# Patient Record
Sex: MALE
[De-identification: names, ages, dates, MRNs, and addresses within clinical notes are randomized per-mention and may not be internally consistent; named-entity substitution may affect disease eponyms.]

---

## 2017-03-06 ENCOUNTER — HOSPITAL ENCOUNTER (INPATIENT)
Dept: HOSPITAL 14 - H.ER | Age: 57
LOS: 12 days | Discharge: PSYCHIATRIC OR SUBSTANCE USE FACILITY | DRG: 430 | End: 2017-03-18
Attending: PSYCHIATRY & NEUROLOGY | Admitting: PSYCHIATRY & NEUROLOGY
Payer: MEDICAID

## 2017-03-06 DIAGNOSIS — E78.5: ICD-10-CM

## 2017-03-06 DIAGNOSIS — E78.00: ICD-10-CM

## 2017-03-06 DIAGNOSIS — E86.0: ICD-10-CM

## 2017-03-06 DIAGNOSIS — Z95.1: ICD-10-CM

## 2017-03-06 DIAGNOSIS — F17.200: ICD-10-CM

## 2017-03-06 DIAGNOSIS — I25.10: ICD-10-CM

## 2017-03-06 DIAGNOSIS — J45.909: ICD-10-CM

## 2017-03-06 DIAGNOSIS — E87.5: ICD-10-CM

## 2017-03-06 DIAGNOSIS — F20.0: Primary | ICD-10-CM

## 2017-03-06 LAB
ALBUMIN/GLOB SERPL: 1.4 {RATIO} (ref 1–2.1)
ALP SERPL-CCNC: 60 U/L (ref 38–126)
ALT SERPL-CCNC: 52 U/L (ref 21–72)
AST SERPL-CCNC: 107 U/L (ref 17–59)
BASOPHILS # BLD AUTO: 0 K/UL (ref 0–0.2)
BASOPHILS NFR BLD: 0.4 % (ref 0–2)
BILIRUB SERPL-MCNC: 0.6 MG/DL (ref 0.2–1.3)
BILIRUB UR-MCNC: NEGATIVE MG/DL
BUN SERPL-MCNC: 23 MG/DL (ref 9–20)
CALCIUM SERPL-MCNC: 10.2 MG/DL (ref 8.4–10.2)
CHLORIDE SERPL-SCNC: 105 MMOL/L (ref 98–107)
CO2 SERPL-SCNC: 20 MMOL/L (ref 22–30)
COLOR UR: (no result)
EOSINOPHIL # BLD AUTO: 0 K/UL (ref 0–0.7)
EOSINOPHIL NFR BLD: 0.4 % (ref 0–4)
ERYTHROCYTE [DISTWIDTH] IN BLOOD BY AUTOMATED COUNT: 13.8 % (ref 11.5–14.5)
ETHANOL SERPL-MCNC: < 10 MG/DL (ref 0–10)
GLOBULIN SER-MCNC: 3.5 GM/DL (ref 2.2–3.9)
GLUCOSE SERPL-MCNC: 111 MG/DL (ref 75–110)
GLUCOSE UR STRIP-MCNC: (no result) MG/DL
HCT VFR BLD CALC: 47.2 % (ref 35–51)
KETONES UR STRIP-MCNC: 20 MG/DL
LEUKOCYTE ESTERASE UR-ACNC: (no result) LEU/UL
LYMPHOCYTES # BLD AUTO: 1.9 K/UL (ref 1–4.3)
LYMPHOCYTES NFR BLD AUTO: 21.7 % (ref 20–40)
MCH RBC QN AUTO: 30.2 PG (ref 27–31)
MCHC RBC AUTO-ENTMCNC: 32.8 G/DL (ref 33–37)
MCV RBC AUTO: 92.2 FL (ref 80–94)
MONOCYTES # BLD: 0.7 K/UL (ref 0–0.8)
MONOCYTES NFR BLD: 8.5 % (ref 0–10)
NEUTROPHILS # BLD: 6 K/UL (ref 1.8–7)
NEUTROPHILS NFR BLD AUTO: 69 % (ref 50–75)
NRBC BLD AUTO-RTO: 0.1 % (ref 0–0)
PH UR STRIP: 5 [PH] (ref 5–8)
PLATELET # BLD: 288 K/UL (ref 130–400)
PMV BLD AUTO: 8.2 FL (ref 7.2–11.7)
POTASSIUM SERPL-SCNC: 3.4 MMOL/L (ref 3.6–5)
PROT SERPL-MCNC: 8.5 G/DL (ref 6.3–8.2)
PROT UR STRIP-MCNC: 100 MG/DL
RBC # UR STRIP: NEGATIVE /UL
RBC #/AREA URNS HPF: 7 /HPF (ref 0–3)
SODIUM SERPL-SCNC: 148 MMOL/L (ref 132–148)
SP GR UR STRIP: 1.03 (ref 1–1.03)
UROBILINOGEN UR-MCNC: 2 MG/DL (ref 0.2–1)
WBC # BLD AUTO: 8.7 K/UL (ref 4.8–10.8)
WBC #/AREA URNS HPF: 8 /HPF (ref 0–5)

## 2017-03-06 NOTE — ED PDOC
HPI: Psych/Substance Abuse


Time Seen by Provider: 03/06/17 20:38


Chief Complaint (Nursing): Psychiatric Evaluation


Chief Complaint (Provider): crisis eval


History Per: Patient, EMS


Additional History Per: Patient, EMS


Additional Complaint(s): 


57 y/o male brought in by EMS for crisis eval.  Patient found outside staring 

at stop sign.  Upon arrival patient with flight of idea's, speaking in 

[place] terms. however, responds appropriately when direct questions asked. 

Denies suicidal/homicidal ideations, acute medical complaints.  





Past Medical History


Reviewed: Historical Data, Nursing Documentation, Vital Signs


Vital Signs: 





 Last Vital Signs











Temp  97.5 F L  03/06/17 20:28


 


Pulse  96 H  03/06/17 20:28


 


Resp  16   03/06/17 20:28


 


BP  162/98 H  03/06/17 20:28


 


Pulse Ox  99   03/06/17 20:28














- Medical History


PMH: Hypercholesterolemia


   Denies: Chronic Kidney Disease





- Surgical History


Surgical History: CABG (may 14)





- Family History


Family History: States: Unknown Family Hx





- Immunization History


Hx Tetanus Toxoid Vaccination: No


Hx Influenza Vaccination: Yes


Hx Pneumococcal Vaccination: No





- Home Medications


Home Medications: 


 Ambulatory Orders











 Medication  Instructions  Recorded


 


Pseudoephedrine Hydrochlorid 120 mg PO Q12 PRN 03/27/14





[Sudafed 12 Hour]  


 


Albuterol HFA [Ventolin HFA 90 2 puff IH [id number] PRN #1 puff 10/18/14





mcg/actuation (8 g)]  














- Allergies


Allergies/Adverse Reactions: 


 Allergies











Allergy/AdvReac Type Severity Reaction Status Date / Time


 


No Known Allergies Allergy   Verified 10/18/14 20:06














Review of Systems


ROS Statement: Except As Marked, All Systems Reviewed And Found Negative


Psych: Positive for: Psychosis





Physical Exam





- Reviewed


Nursing Documentation Reviewed: Yes


Vital Signs Reviewed: Yes





- Physical Exam


Appears: Positive for: Well, Non-toxic, No Acute Distress


Head Exam: Positive for: ATRAUMATIC, NORMAL INSPECTION, NORMOCEPHALIC


Skin: Positive for: Normal Color


Eye Exam: Positive for: Normal appearance, EOMI, PERRL


ENT: Positive for: Normal ENT Inspection


Cardiovascular/Chest: Positive for: Regular Rate, Rhythm


Respiratory: Positive for: Normal Breath Sounds


Gastrointestinal/Abdominal: Positive for: Normal Exam


Extremity: Positive for: Normal ROM


Neurologic/Psych: Positive for: Alert, Oriented





- Laboratory Results


Result Diagrams: 


 03/06/17 21:20





 03/06/17 21:20





- ECG


ECG: Positive for: Viewed By Me (reviewed by ED attending)


ECG Rhythm: Positive for: Sinus Rhythm


O2 Sat by Pulse Oximetry: 99





- Radiology


X-Ray: Viewed By Me


X-Ray Interpretation: No Acute Disease





- Progress


ED Course And Treament: 


labs, ekg, chest xray, urine, crisis eval











EXAM:


CT Head Without Intravenous Contrast.


CLINICAL HISTORY:


56 years old, male; Signs and symptoms; Altered mental status/memory loss; 

Additional info: AMS


TECHNIQUE:


Axial computed tomography images of the head/brain without intravenous 

contrast. This CT exam


was performed using one or more of the following dose reduction techniques: 

automated exposure


control, adjustment of the mA and/or kV according to patient size, and/or use 

of iterative


reconstruction technique.


Coronal reformatted images were created and reviewed.


EXAM DATE/TIME:


3/6/2017 11:33 PM


COMPARISON:


There are no prior studies for comparison.


FINDINGS:


Brain: Ventricles are normal in size and configuration. There is no midline 

shift. There is mild


prominence of sulci and gyri. There are no intra-axial or extra-axial mass 

lesions or areas of


hemorrhage. There are no abnormal fluid collections. Gray-white differentiation 

is maintained.


Ventricles: See above.


Bones: Cranial vault is intact.


Soft tissues: unremarkable


Sinuses: There is mucoperiosteal thickening in the sinuses. There is an air-

fluid level in the right


maxillary sinus..


Ears and mastoids: Middle ears and mastoids are unremarkable


Orbits: Orbital contents are unremarkable.


IMPRESSION: No acute intracranial abnormality; sinus disease








Medical Decision Making


Medical Decision Making: 


Patient medically stable for psych admission.





Disposition





- Clinical Impression


Clinical Impression: 


 Acute psychosis





- Patient ED Disposition


Is Patient to be Admitted: Yes





- Disposition


Disposition Time: 00:10


Condition: STABLE





- Pt Status Changed To:


Hospital Disposition Of: Inpatient





- Admit Certification


Admit to Inpatient:: After my assessment, the patient will require 

hospitalization for at least two midnights.  This is because of the severity of 

symptoms shown, intensity of services needed, and/or the medical risk in this 

patient being treated as an outpatient.





- POA


Present On Arrival: None

## 2017-03-07 VITALS — OXYGEN SATURATION: 99 %

## 2017-03-07 PROCEDURE — GZHZZZZ GROUP PSYCHOTHERAPY: ICD-10-PCS | Performed by: PSYCHIATRY & NEUROLOGY

## 2017-03-07 NOTE — CP.PCM.CON
History of Present Illness





- History of Present Illness


History of Present Illness: 


Attending: Dr Montelongo





PCP: Not on Staff





Reason for Consult: Management of Asthma/ CAD and other medical maters





Chief Complaint: Crisis evaluation for bizarre behaviour





HPI: The hx is obtained from the  patient and the medical records. He is seen 

and examined in his room. He is a 56years old male with hx of HLd CAD Asthma 

and double bypass cardiac surgery was brought in for crisis evaluation after he 

was found out in the cold weather in inappropriate clothing, kneeling on the 

side walk stating that he was waiting for the stoplight to change. In the ED he 

was noted to have flight of ideas but responded to direct questioning. no 

headaches, dizziness,nausea, vomits, chest pain, palpitations, SOB , diarrhea 

nor urinary symptoms.








PMH: HLD, CAD, Child gonzalez Asthma





PSH: Double vessel CABG  on May of 2014





SH: light smoker 1/2PPd; No alcohol use; no illegal substance abuse





FH: No known family diseases





Allergies: NKDA





Review of Systems





- Constitutional


Constitutional: absent: Anorexia, Chills, Fatigue, Fever, Headache, Lethargy





- EENT


Eyes: Requires Corrective Lenses.  absent: Diplopia, Floaters, Spots in Vision


Ears: absent: Ear Discharge, Ear Pain, Tinnitus, Disequilibrium


Nose/Mouth/Throat: absent: Epistaxis, Nasal Congestion, Nasal Discharge, Sinus 

Pain, Sinus Pressure, Sore Throat





- Cardiovascular


Cardiovascular: absent: Chest Pain, Dyspnea, Leg Edema, Leg Ulcers





- Respiratory


Respiratory: absent: Cough, Dyspnea, Wheezing, Stridor, Chest Congestion





- Gastrointestinal


Gastrointestinal: absent: Constipation, Diarrhea, Nausea, Vomiting





- Genitourinary


Genitourinary: absent: Dysuria, Flank Pain, Hematuria, Urinary Frequency, Freq 

UTI





- Musculoskeletal


Musculoskeletal: absent: Arthralgias, Back Pain, Numbness, Stiffness





- Integumentary


Integumentary: absent: Pruritus, Rash, Skin Ulcer, Striae, Swelling, Other





- Neurological


Neurological: absent: Confusion, Dizziness, Headaches, Sensory Deficit, Weakness





- Psychiatric


Psychiatric: Confusion.  absent: Anxiety, Depression, Memory Loss, Panic Attacks





- Endocrine


Endocrine: absent: Palpitations, Polydipsia, Polyphagia, Polyuria





- Hematologic/Lymphatic


Hematologic: absent: Easy Bleeding, Easy Bruising





Past Patient History





- Past Medical History & Family History


Past Medical History?: Yes





- Past Social History


Smoking Status: Current Some Days Smoker


Chewing Tobacco Use: No


Cigar Use: No


Alcohol: None


Drugs: Denies


Home Situation {Lives}: Alone





- CARDIAC


Hx Hypercholesterolemia: Yes





- PULMONARY


Hx Respiratory Disorders: No





- NEUROLOGICAL


Hx Neurological Disorder: No





- HEENT


Hx HEENT Problems: Yes


Other/Comment: wears eyeglasses





- RENAL


Hx Chronic Kidney Disease: No





- ENDOCRINE/METABOLIC


Hx Endocrine Disorders: No





- HEMATOLOGICAL/ONCOLOGICAL


Hx Blood Disorders: No





- INTEGUMENTARY


Hx Dermatological Problems: No





- MUSCULOSKELETAL/RHEUMATOLOGICAL


Hx Musculoskeletal Disorders: No


Hx Falls: No





- GASTROINTESTINAL


Hx Gastrointestinal Disorders: No





- GENITOURINARY/GYNECOLOGICAL


Hx Genitourinary Disorders: No





- PSYCHIATRIC


Hx Psychophysiologic Disorder: No


Hx Substance Use: No





- SURGICAL HISTORY


Hx Coronary Artery Bypass Graft: Yes (may 14)





- ANESTHESIA


Hx Anesthesia: Yes


Hx Anesthesia Reactions: No





Meds


Allergies/Adverse Reactions: 


 Allergies











Allergy/AdvReac Type Severity Reaction Status Date / Time


 


No Known Allergies Allergy   Verified 10/18/14 20:06














- Medications


Medications: 


 Current Medications





Acetaminophen (Tylenol 325mg Tab)  650 mg PO Q4 PRN


   PRN Reason: t>101,headache,pain 1-7


Al Hydrox/Mg Hydrox/Simethicone (Maalox Plus 30 Ml)  30 ml PO Q4 PRN


   PRN Reason: Dyspepsia


Diphenhydramine HCl (Benadryl)  50 mg PO Q6 PRN


   PRN Reason: Extrapyramidal Symptoms


Diphenhydramine HCl (Benadryl)  50 mg IM Q6 PRN


   PRN Reason: Extrapyramidal S/S Unable PO


   Last Admin: 03/07/17 02:40 Dose:  50 mg


Diphenhydramine HCl (Benadryl)  50 mg PO HS PRN


   PRN Reason: Sleep


Haloperidol (Haldol)  5 mg PO Q4 PRN


   PRN Reason: Agitation


Haloperidol Lactate (Haldol)  5 mg IM Q4 PRN


   PRN Reason: Agitation, Unable to Take PO


   Last Admin: 03/07/17 02:39 Dose:  5 mg


Lorazepam (Ativan)  2 mg PO Q4 PRN


   PRN Reason: Anxiety/Agitation


Lorazepam (Ativan)  2 mg IM Q4 PRN


   PRN Reason: Anxiety/Agitation,Unable PO


   Last Admin: 03/07/17 02:39 Dose:  2 mg


Risperidone (Risperdal Tab)  1 mg PO HS IRA











Physical Exam





- Constitutional


Appears: No Acute Distress





- Head Exam


Head Exam: ATRAUMATIC, NORMAL INSPECTION, NORMOCEPHALIC





- Eye Exam


Eye Exam: EOMI, Normal appearance


Pupil Exam: NORMAL ACCOMODATION, PERRL





- ENT Exam


ENT Exam: Mucous Membranes Moist, Normal Exam, Normal External Ear Exam, Normal 

Oropharynx





- Neck Exam


Neck exam: Positive for: Full Rom, Normal Inspection.  Negative for: 

Lymphadenopathy, Tenderness





- Respiratory Exam


Respiratory Exam: Clear to Auscultation Bilateral.  absent: Rales, Rhonchi, 

Wheezes





- Cardiovascular Exam


Cardiovascular Exam: REGULAR RHYTHM, RRR, +S1, +S2.  absent: Gallop





- GI/Abdominal Exam


GI & Abdominal Exam: Normal Bowel Sounds, Soft.  absent: Mass, Organomegaly, 

Tenderness





- Rectal Exam


Rectal Exam: Deferred





- Extremities Exam


Extremities exam: Positive for: full ROM, normal inspection.  Negative for: 

calf tenderness, pedal edema





- Back Exam


Back exam: NORMAL INSPECTION.  absent: CVA tenderness (L), CVA tenderness (R)





- Neurological Exam


Neurological exam: Alert, CN II-XII Intact, Normal Gait, Oriented x3, Reflexes 

Normal





- Psychiatric Exam


Psychiatric exam: Normal Affect, Normal Mood





- Skin


Skin Exam: Dry, Intact, Normal Color, Warm





Results





- Vital Signs


Recent Vital Signs: 


 Last Vital Signs











Temp  97.2 F L  03/07/17 17:00


 


Pulse  83   03/07/17 17:00


 


Resp  18   03/07/17 17:00


 


BP  129/73   03/07/17 17:00


 


Pulse Ox  99   03/07/17 02:20














- Labs


Result Diagrams: 


 03/06/17 21:20





 03/06/17 21:20





- EKG Data


EKG comments: 


NSR 96/min





- Imaging and Cardiology


  ** Chest x-ray


Status: Image reviewed by me, Report reviewed by me


Additional comment: 


No Infiltrate





  ** CT scan - head


Status: Image reviewed by me, Report reviewed by me


Additional comment: 


No acute intracraneal abnormality





Assessment & Plan





- Assessment and Plan (Free Text)


Assessment: 


#. Psychiatric Disorder with Psychosis


#. Dehydration


#. Hyperkalemia


#. CAD s/p CABG


#. HLD


#. Asthma


Plan: 


56years old male with hx of HLd CAD Asthma and double bypass cardiac surgery 

was brought in for crisis evaluation after he was found out in the cold weather 

in inappropriate clothing, kneeling on the side walk stating that he was 

waiting for the stoplight to change. In the ED he was noted to have flight of 

ideas but responded to direct questioning. no headaches, dizziness,nausea, 

vomits, chest pain, palpitations, SOB , diarrhea nor urinary symptoms.





#. Psychiatric Disorder with Psychosis


- psychiatric management





#. Dehydration


- Encourage Fluid intake


- Follow renal labs





#. Hyperkalemia


- Repleted


- follow Electrolytes





#. CAD s/p CABG


- Restart ASA 81mg Po daioy


- lipitor start at 20mg Po daily and increase to therapeutic of 80mg daily


- follow lipid profile





#. HLD


- follow Lipid profile





#. Asthma


- Albuterol MDI Q6H PRN Wheezing/SOB





#. Elevated AST Not sufficiently high to hold statin, r/o Hepatitis C


- Follow hepatitis C antibody











- Date & Time


Date: 03/07/17


Time: 22:35

## 2017-03-07 NOTE — CT
EXAM:

  CT Head Without Intravenous Contrast.



CLINICAL HISTORY:

  56 years old, male; Signs and symptoms; Altered mental status/memory loss; 

Additional info: AMS



TECHNIQUE:

  Axial computed tomography images of the head/brain without intravenous 

contrast.  This CT exam was performed using one or more of the following dose 

reduction techniques:  automated exposure control, adjustment of the mA and/or 

kV according to patient size, and/or use of iterative reconstruction technique.

  Coronal reformatted images were created and reviewed.



EXAM DATE/TIME:

  3/6/2017 11:33 PM



COMPARISON:

  There are no prior studies for comparison.



FINDINGS:

  Brain:  Ventricles are normal in size and configuration. There is no midline 

shift.  There is mild prominence of sulci and gyri.  There are no intra-axial 

or extra-axial mass lesions or areas of hemorrhage. There are no abnormal fluid 

collections. Gray-white differentiation is maintained.

  Ventricles:  See above.

  Bones: Cranial vault is intact.

  Soft tissues:  unremarkable

  Sinuses:  There is mucoperiosteal thickening in the sinuses.  There is an 

air-fluid level in the right maxillary sinus..

   Ears and mastoids: Middle ears and mastoids are unremarkable

  Orbits:  Orbital contents are unremarkable.



IMPRESSION:  No acute intracranial abnormality; sinus disease

## 2017-03-07 NOTE — CARD
--------------- APPROVED REPORT --------------





EKG Measurement

Heart [id number]

[id number]

[id number]

[id number]

[id number]



&lt;Conclusion&gt;

Normal sinus rhythm

Possible Left atrial enlargement

Borderline ECG

## 2017-03-07 NOTE — RAD
HISTORY:

admission  



COMPARISON:

10/18/2014



TECHNIQUE:

Chest PA and lateral



FINDINGS:



LUNGS:

No active pulmonary disease.



PLEURA:

No significant pleural effusion identified. No pneumothorax apparent.



CARDIOVASCULAR:

Normal.



OSSEOUS STRUCTURES:

Sternal wires



VISUALIZED UPPER ABDOMEN:

Normal.



OTHER FINDINGS:

None.



IMPRESSION:

No active disease.
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
## 2017-03-08 LAB
BUN SERPL-MCNC: 17 MG/DL (ref 9–20)
CALCIUM SERPL-MCNC: 9.8 MG/DL (ref 8.4–10.2)
CHLORIDE SERPL-SCNC: 104 MMOL/L (ref 98–107)
CO2 SERPL-SCNC: 26 MMOL/L (ref 22–30)
GLUCOSE SERPL-MCNC: 89 MG/DL (ref 75–110)
POTASSIUM SERPL-SCNC: 3.5 MMOL/L (ref 3.6–5)
SODIUM SERPL-SCNC: 146 MMOL/L (ref 132–148)

## 2017-03-08 NOTE — PCM.PYCHPN
Psychiatric Progress Note





- Psychiatric Progress Note


Patient seen today, length of contact: in treatment team


Patient Chief Complaint: 


i am being subservient aren't i?


Problems Identified/Issues Discussed: 


pt seen in team. denying psychotic symptoms, but thoughts are not organized. he 

intellectualizes his symptoms/bizarre behaviors as an linguistic and 

philosophical choice. he takes risperdal despite denying he has a mental 

illness. he does endorse feeling better. he seems to be taking on a strategy to 

be passive and accepting any treatment offered to improve his chances of 

discharge.


Medication Change: No


Medical Record Reviewed: Yes





Mental Status Examination





- Cognitive Function


Orientation: Person, Place, Situation


Memory: Intact


Attention: WNL


Concentration: WNL


Association: Loose


Fund of Knowledge: WNL


Decription of patient's judgement and insights: 


poor insight, fair judgment





- Mood


Mood: Neutral





- Affect


Affect: Blunted





- Speech


Speech: Appropriate





- Formal Thought Process


Formal Thought Process: Delusions, Paranoia, Loosening of associations, Flight 

of ideas


Psychotic Thoughts and Behaviors: 


pt with disorganized thoughts, tangential answers to questions. 





- Suicidal Ideation


Suicidal Ideation: No





- Homicidal Ideation


Homicidal Ideation: No





Goal/Treatment Plan





- Goal/Treatment Plan


Need for Continued Stay: Remain at risks for inpatient hospitalization, 

Discharge may exacerbated symptoms, Severe functional impairment


Progress Toward Problem(s) and Goals/Treatment Plan: 


schizophrenia





needs further treatment


will increase risperdal tomorrow


disposition planing- could benefit from a php program


Estimated Date of D/C: 03/13/17

## 2017-03-09 NOTE — PCM.PYCHPN
Psychiatric Progress Note





- Psychiatric Progress Note


Patient seen today, length of contact: in treatment team


Patient Chief Complaint: 


i will do as i'm told


Problems Identified/Issues Discussed: 


pt spends time in room talking to himself. he denies medication side effects. 

he is without any aggression or agitation. continues to deny he has a mental 

illness despite accepting treatment. 


Medication Change: No


Medical Record Reviewed: Yes





Mental Status Examination





- Cognitive Function


Orientation: Person, Place, Situation


Memory: Intact


Attention: WNL


Concentration: WNL


Association: Loose


Fund of Knowledge: WNL


Decription of patient's judgement and insights: 


superficial insight, fair judgment





- Mood


Mood: Neutral





- Affect


Affect: Blunted





- Speech


Speech: Appropriate





- Formal Thought Process


Formal Thought Process: Delusions, Paranoia, Loosening of associations, Flight 

of ideas





- Suicidal Ideation


Suicidal Ideation: No





- Homicidal Ideation


Homicidal Ideation: No





Goal/Treatment Plan





- Goal/Treatment Plan


Need for Continued Stay: Remain at risks for inpatient hospitalization, 

Discharge may exacerbated symptoms, Severe functional impairment


Progress Toward Problem(s) and Goals/Treatment Plan: 


schizophrenia





needs further treatment


will increase risperdal to 2mg hs


disposition planing- could benefit from a php program


Estimated Date of D/C: 03/13/17

## 2017-03-10 RX ADMIN — RISPERIDONE SCH MG: 1 TABLET, ORALLY DISINTEGRATING ORAL at 11:30

## 2017-03-10 NOTE — PCM.PYCHPN
Psychiatric Progress Note





- Psychiatric Progress Note


Patient seen today, length of contact: discussed with team


Patient Chief Complaint: 


did you get the article?


Problems Identified/Issues Discussed: 


pt still pacing halls or in room talking to self. pt wants to share an article 

about psychology and multiple dimensions and asks to provide team with a power 

point presentation about the subject. he is complaint with medications. he 

denies side effects. he is bizarre in his behavior with peers./staff.


Medication Change: Yes (increase risperdal)


Medical Record Reviewed: Yes





Mental Status Examination





- Cognitive Function


Orientation: Person, Place, Situation


Memory: Intact


Attention: WNL


Concentration: WNL


Association: Loose


Fund of Knowledge: WNL





- Mood


Mood: Neutral





- Affect


Affect: Blunted





- Speech


Speech: Appropriate





- Formal Thought Process


Formal Thought Process: Delusions, Paranoia, Loosening of associations, Flight 

of ideas





- Suicidal Ideation


Suicidal Ideation: No





- Homicidal Ideation


Homicidal Ideation: No





Goal/Treatment Plan





- Goal/Treatment Plan


Need for Continued Stay: Remain at risks for inpatient hospitalization, 

Discharge may exacerbated symptoms, Severe functional impairment


Progress Toward Problem(s) and Goals/Treatment Plan: 


schizophrenia





needs further treatment


will increase risperdal to 2mg hs


disposition planing- could benefit from a php program


Estimated Date of D/C: 03/13/17

## 2017-03-11 RX ADMIN — RISPERIDONE SCH MG: 1 TABLET, ORALLY DISINTEGRATING ORAL at 09:39

## 2017-03-11 NOTE — PCM.PYCHPN
Psychiatric Progress Note





- Psychiatric Progress Note


Patient seen today, length of contact: pt seen and evaluated


Patient Chief Complaint: 


pt has remained very delusional and grandiose with disorganized thinking


DSM 5 Symptoms Update: 


schizophrenia ,paranoid type


Medication Change: Yes (increase risperdal)


Medical Record Reviewed: Yes





Mental Status Examination





- Cognitive Function


Orientation: Person, Place, Situation


Memory: Intact


Attention: WNL


Concentration: WNL


Association: Loose


Fund of Knowledge: WNL





- Mood


Mood: Neutral





- Affect


Affect: Blunted





- Speech


Speech: Appropriate





- Formal Thought Process


Formal Thought Process: Delusions, Paranoia, Loosening of associations, Flight 

of ideas





- Suicidal Ideation


Suicidal Ideation: No





- Homicidal Ideation


Homicidal Ideation: No





Goal/Treatment Plan





- Goal/Treatment Plan


Need for Continued Stay: Remain at risks for inpatient hospitalization, 

Discharge may exacerbated symptoms, Severe functional impairment


Progress Toward Problem(s) and Goals/Treatment Plan: 


will increase risperdal to 2mg in am to stabilize the psychosis


Estimated Date of D/C: 03/13/17

## 2017-03-12 NOTE — PCM.PYCHPN
Psychiatric Progress Note





- Psychiatric Progress Note


Patient seen today, length of contact: pt seen and evaluated


Patient Chief Complaint: 


pt has remained very delusional and grandiose with disorganized thinking


pt has been less paranoid with increase in risperdal


Problems Identified/Issues Discussed: 


admitted for grandiose delusions and disorganized thinking


DSM 5 Symptoms Update: 


schizophrenia disorganized


Medication Change: Yes (increase risperdal)


Medical Record Reviewed: Yes





Mental Status Examination





- Cognitive Function


Orientation: Person, Place, Situation


Memory: Intact


Attention: WNL


Concentration: WNL


Association: Loose


Fund of Knowledge: WNL





- Mood


Mood: Neutral





- Affect


Affect: Blunted





- Speech


Speech: Appropriate





- Formal Thought Process


Formal Thought Process: Delusions, Paranoia, Loosening of associations, Flight 

of ideas





- Suicidal Ideation


Suicidal Ideation: No





- Homicidal Ideation


Homicidal Ideation: No





Goal/Treatment Plan





- Goal/Treatment Plan


Need for Continued Stay: Remain at risks for inpatient hospitalization, 

Discharge may exacerbated symptoms, Severe functional impairment


Progress Toward Problem(s) and Goals/Treatment Plan: 


will increase risperdal to 2mg in am to stabilize the psychosis


Estimated Date of D/C: 03/13/17

## 2017-03-13 RX ADMIN — RISPERIDONE SCH MG: 2 TABLET, ORALLY DISINTEGRATING ORAL at 10:14

## 2017-03-13 RX ADMIN — RISPERIDONE SCH: 2 TABLET, ORALLY DISINTEGRATING ORAL at 23:33

## 2017-03-13 NOTE — PCM.PYCHPN
Psychiatric Progress Note





- Psychiatric Progress Note


Patient seen today, length of contact: discussed with team


Patient Chief Complaint: 


i am well thank you


Problems Identified/Issues Discussed: 


pt still internally preoccupied. bizarre at times. attempts to engage peers/

staff in intellectual conversations on esoteric topics with little insight into 

his own behaviors and no acknowledgment that he has a mental illness. he is 

still agreeable to treatment and he is denying any medication side effects. 


Medication Change: Yes (continue to increase risperdal)


Medical Record Reviewed: Yes





Mental Status Examination





- Cognitive Function


Orientation: Person, Place, Situation


Memory: Intact


Attention: WNL


Concentration: WNL


Association: Loose


Fund of Knowledge: WNL


Decription of patient's judgement and insights: 


poor insight, fair judgment





- Mood


Mood: Neutral





- Affect


Affect: Blunted





- Speech


Speech: Appropriate





- Formal Thought Process


Formal Thought Process: Delusions, Paranoia, Loosening of associations, Flight 

of ideas





- Suicidal Ideation


Suicidal Ideation: No





- Homicidal Ideation


Homicidal Ideation: No





Goal/Treatment Plan





- Goal/Treatment Plan


Need for Continued Stay: Remain at risks for inpatient hospitalization, 

Discharge may exacerbated symptoms, Severe functional impairment


Progress Toward Problem(s) and Goals/Treatment Plan: 


schizophrenia





needs further treatment


will increase risperdal to 2mg hs and 2mg am


disposition planing- could benefit from a php program


Estimated Date of D/C: 03/17/17

## 2017-03-14 RX ADMIN — RISPERIDONE SCH: 2 TABLET, ORALLY DISINTEGRATING ORAL at 09:44

## 2017-03-14 RX ADMIN — RISPERIDONE SCH: 2 TABLET, ORALLY DISINTEGRATING ORAL at 21:40

## 2017-03-14 NOTE — PCM.PYCHPN
Psychiatric Progress Note





- Psychiatric Progress Note


Patient seen today, length of contact: Patient evaluated, chart reviewed, case 

discussed with team


Problems Identified/Issues Discussed: 


Patient continues to be disorganized, loose and tangential on conversation.  He 

reports his mood is good, but does not give direct answers to most questions.   

When asked about AH, he stated that all people hear AH; but it is unclear if he 

is actually having AH.  He is stating that he wants to leave the hospital soon 

and is upset that he wasn't given an orientation on how to behave on the unit.  

Denies suicidal/homicidal ideation. 


Medication Change: Yes (Increase Risperdal to 2 mg PO AM/ 2.5 mg PO HS)


Medical Record Reviewed: Yes





Mental Status Examination





- Cognitive Function


Orientation: Person, Place, Situation


Memory: Intact


Attention: WNL


Concentration: WNL


Association: Loose


Fund of Knowledge: WNL


Decription of patient's judgement and insights: 


Poor I/J





- Mood


Mood: Neutral





- Affect


Affect: Broad





- Speech


Speech: Appropriate





- Formal Thought Process


Formal Thought Process: Delusions, Paranoia, Loosening of associations, Flight 

of ideas





- Suicidal Ideation


Suicidal Ideation: No





- Homicidal Ideation


Homicidal Ideation: No





Goal/Treatment Plan





- Goal/Treatment Plan


Need for Continued Stay: Remain at risks for inpatient hospitalization, 

Discharge may exacerbated symptoms, Severe functional impairment


Progress Toward Problem(s) and Goals/Treatment Plan: 


Impression: Schizophrenia, acutely psychotic and disorganized, requires 

continued inpatient admission for treatment and safety.





Plan:


-Increase Risperdal to 2 mg PO AM/ 2.5 mg PO HS


-Individual, group and milieu tx


-Disposition planing- could benefit from a php program


Estimated Date of D/C: 03/17/17

## 2017-03-15 VITALS — RESPIRATION RATE: 18 BRPM

## 2017-03-15 LAB
ALBUMIN/GLOB SERPL: 1.5 {RATIO} (ref 1–2.1)
ALP SERPL-CCNC: 67 U/L (ref 38–126)
ALT SERPL-CCNC: 49 U/L (ref 21–72)
AST SERPL-CCNC: 46 U/L (ref 17–59)
BASOPHILS # BLD AUTO: 0 K/UL (ref 0–0.2)
BASOPHILS NFR BLD: 0.8 % (ref 0–2)
BILIRUB SERPL-MCNC: 0.3 MG/DL (ref 0.2–1.3)
BILIRUB UR-MCNC: NEGATIVE MG/DL
BUN SERPL-MCNC: 12 MG/DL (ref 9–20)
CALCIUM SERPL-MCNC: 10.1 MG/DL (ref 8.4–10.2)
CHLORIDE SERPL-SCNC: 102 MMOL/L (ref 98–107)
CO2 SERPL-SCNC: 27 MMOL/L (ref 22–30)
COLOR UR: YELLOW
EOSINOPHIL # BLD AUTO: 0.2 K/UL (ref 0–0.7)
EOSINOPHIL NFR BLD: 2.8 % (ref 0–4)
ERYTHROCYTE [DISTWIDTH] IN BLOOD BY AUTOMATED COUNT: 13.5 % (ref 11.5–14.5)
GLOBULIN SER-MCNC: 3.1 GM/DL (ref 2.2–3.9)
GLUCOSE SERPL-MCNC: 101 MG/DL (ref 75–110)
GLUCOSE UR STRIP-MCNC: (no result) MG/DL
HCT VFR BLD CALC: 45 % (ref 35–51)
KETONES UR STRIP-MCNC: NEGATIVE MG/DL
LEUKOCYTE ESTERASE UR-ACNC: (no result) LEU/UL
LYMPHOCYTES # BLD AUTO: 1.6 K/UL (ref 1–4.3)
LYMPHOCYTES NFR BLD AUTO: 28.3 % (ref 20–40)
MCH RBC QN AUTO: 30.5 PG (ref 27–31)
MCHC RBC AUTO-ENTMCNC: 33.1 G/DL (ref 33–37)
MCV RBC AUTO: 92.2 FL (ref 80–94)
MONOCYTES # BLD: 1.1 K/UL (ref 0–0.8)
MONOCYTES NFR BLD: 20 % (ref 0–10)
NEUTROPHILS # BLD: 2.7 K/UL (ref 1.8–7)
NEUTROPHILS NFR BLD AUTO: 48.1 % (ref 50–75)
NRBC BLD AUTO-RTO: 0.1 % (ref 0–0)
PH UR STRIP: 6 [PH] (ref 5–8)
PLATELET # BLD: 297 K/UL (ref 130–400)
PMV BLD AUTO: 8 FL (ref 7.2–11.7)
POTASSIUM SERPL-SCNC: 3.5 MMOL/L (ref 3.6–5)
PROT SERPL-MCNC: 7.8 G/DL (ref 6.3–8.2)
PROT UR STRIP-MCNC: NEGATIVE MG/DL
RBC # UR STRIP: NEGATIVE /UL
RBC #/AREA URNS HPF: 1 /HPF (ref 0–3)
SODIUM SERPL-SCNC: 145 MMOL/L (ref 132–148)
SP GR UR STRIP: 1.02 (ref 1–1.03)
UROBILINOGEN UR-MCNC: 2 MG/DL (ref 0.2–1)
WBC # BLD AUTO: 5.6 K/UL (ref 4.8–10.8)
WBC #/AREA URNS HPF: 3 /HPF (ref 0–5)

## 2017-03-15 RX ADMIN — RISPERIDONE SCH: 2 TABLET, ORALLY DISINTEGRATING ORAL at 21:36

## 2017-03-15 RX ADMIN — RISPERIDONE SCH: 2 TABLET, ORALLY DISINTEGRATING ORAL at 17:33

## 2017-03-15 NOTE — PCM.PYCHPN
Psychiatric Progress Note





- Psychiatric Progress Note


Patient seen today, length of contact: discussed with team


Patient Chief Complaint: 


i don't have psychosis


Problems Identified/Issues Discussed: 


pt is refusing risperdal as he states it "freezes my whole alimentary canal" he 

denies being psychotic and defends his odd behaviors prior to admission as 

"psychoanalytic fanaticism" he feels he should be discharged and states he only 

stayed in the hospital because he was told he only would stay 5-7 days. he is 

pacing the halls, internally preoccupied. 


Medication Change: No ( )


Medical Record Reviewed: Yes





Mental Status Examination





- Cognitive Function


Orientation: Person, Place, Situation


Memory: Intact


Attention: WNL


Concentration: WNL


Association: Loose


Fund of Knowledge: WNL


Decription of patient's judgement and insights: 


poor





- Mood


Mood: Neutral





- Affect


Affect: Blunted





- Speech


Speech: Appropriate





- Formal Thought Process


Formal Thought Process: Delusions, Paranoia, Loosening of associations, Flight 

of ideas


Psychotic Thoughts and Behaviors: 


disorganized thoughts, denies a/v hallucinations





- Suicidal Ideation


Suicidal Ideation: No





- Homicidal Ideation


Homicidal Ideation: No





Goal/Treatment Plan





- Goal/Treatment Plan


Need for Continued Stay: Remain at risks for inpatient hospitalization, 

Discharge may exacerbated symptoms, Severe functional impairment


Progress Toward Problem(s) and Goals/Treatment Plan: 


schizophrenia





needs further treatment


will screen for involuntary hospitalization as pt is refusing treatment


disposition planing- could benefit from a php program


Estimated Date of D/C: 03/17/17

## 2017-03-16 RX ADMIN — RISPERIDONE SCH: 2 TABLET, ORALLY DISINTEGRATING ORAL at 08:42

## 2017-03-16 RX ADMIN — RISPERIDONE SCH: 2 TABLET, ORALLY DISINTEGRATING ORAL at 21:12

## 2017-03-16 NOTE — PCM.PYCHPN
Psychiatric Progress Note





- Psychiatric Progress Note


Patient seen today, length of contact: discussed with team


Patient Chief Complaint: 


do i really have to go there


Problems Identified/Issues Discussed: 


pt is refusing medications. he is asking for niacin only. he attends groups. he 

engages in superficial intellectual discussions. he states his mission is to 

"psychoanalyze the world"  he is paranoid. he is aware of his transfer to [facility] [place] [place].


Medication Change: No ( )


Medical Record Reviewed: Yes





Mental Status Examination





- Cognitive Function


Orientation: Person, Place, Situation


Memory: Intact


Attention: WNL


Concentration: WNL


Association: Loose


Fund of Knowledge: WNL


Decription of patient's judgement and insights: 


fair





- Mood


Mood: Anxious





- Affect


Affect: Blunted





- Speech


Speech: Appropriate





- Formal Thought Process


Formal Thought Process: Delusions, Paranoia, Loosening of associations, Flight 

of ideas


Psychotic Thoughts and Behaviors: 


paranoid, disorganized





- Suicidal Ideation


Suicidal Ideation: No





- Homicidal Ideation


Homicidal Ideation: No





Goal/Treatment Plan





- Goal/Treatment Plan


Need for Continued Stay: Remain at risks for inpatient hospitalization, 

Discharge may exacerbated symptoms, Severe functional impairment, Other


Progress Toward Problem(s) and Goals/Treatment Plan: 


schizophrenia





needs further treatment


pt to transfer to [facility] [place] [place] when a bed available


will start niacin at pt's request.


Estimated Date of D/C: 03/17/17

## 2017-03-17 RX ADMIN — RISPERIDONE SCH: 2 TABLET, ORALLY DISINTEGRATING ORAL at 09:16

## 2017-03-17 RX ADMIN — RISPERIDONE SCH: 2 TABLET, ORALLY DISINTEGRATING ORAL at 21:53

## 2017-03-17 NOTE — PCM.PYCHPN
Psychiatric Progress Note





- Psychiatric Progress Note


Patient seen today, length of contact: discussed with team


Patient Chief Complaint: 


thank you for the niacin


Problems Identified/Issues Discussed: 


pt is refusing medications. he feels niacin is helpful. he is on the pay phone 

much of the am today. he is aware of his pending transfer to another facility. 

he remains disorganized in his thoughts.


Medication Change: No ( )


Medical Record Reviewed: Yes





Mental Status Examination





- Cognitive Function


Orientation: Person, Place, Situation


Memory: Intact


Attention: WNL


Concentration: WNL


Association: Loose


Fund of Knowledge: WNL


Decription of patient's judgement and insights: 


poor





- Mood


Mood: Anxious





- Affect


Affect: Blunted





- Speech


Speech: Appropriate





- Formal Thought Process


Formal Thought Process: Delusions, Paranoia, Loosening of associations, Flight 

of ideas





- Suicidal Ideation


Suicidal Ideation: No





- Homicidal Ideation


Homicidal Ideation: No





Goal/Treatment Plan





- Goal/Treatment Plan


Need for Continued Stay: Remain at risks for inpatient hospitalization, 

Discharge may exacerbated symptoms, Severe functional impairment, Other


Progress Toward Problem(s) and Goals/Treatment Plan: 


schizophrenia





needs further treatment


pt to transfer to [facility] [place] when a bed available


continue niacin


Estimated Date of D/C: 03/17/17

## 2017-03-18 VITALS — SYSTOLIC BLOOD PRESSURE: 132 MMHG | TEMPERATURE: 96.4 F | DIASTOLIC BLOOD PRESSURE: 81 MMHG | HEART RATE: 82 BPM

## 2017-03-18 RX ADMIN — RISPERIDONE SCH: 2 TABLET, ORALLY DISINTEGRATING ORAL at 08:43

## 2017-03-18 NOTE — PCM.PYCHDC
Mental Status Examination





- Mental Status Examination


Description of patient's judgement and insight: 


poor insight


Psychotic Thoughts and Behaviors: 


paranoid, disorganized


Suicidal Ideation: No


Current Homicidal Ideation?: No


Plan: 


see mse from today's progress note





Discharge Summary





- Discharge Note


Reason for Hospitalization: 


bizarre behaviors/psychosis


Psychiatric History (includes Medical, Family, Personal Hx): denies history of 

treatment for psychosis


Consultations:: List each consultation separately and include:  1. Reason for 

request.  2. Findings.  3. Follow-up


Consultations: 


seen by the hospitalist


Summary of Hospital Course include:: 1. Description of specific treatment plan 

utilized for patients during their course of treatmen.  2. Summarize the time-

course for resolution of acute symptoms and/or regressed behaviors.  3. 

Describe issues identified and worked on during hospitalization.  4. Describe 

medication utilized.  5. Describe medical problems identified and treated.  6. 

Reassessment of suicide risk


Summary of Hospital Course: 


pt was admitted to [facility] and oriented to the unit. he was placed on routine 

safety protocols. he was started on risperdal and the dose was titrated up to 

target his psychosis. pt, expressed that "my contact " and stopped 

taking risperdal. he became more irritable and was demanding discharge. he 

signed a 48 hour notice and was screened by [facility] and 

found to meet criteria for an involuntary assessment.





while taking risperdal, but showed minimal improvement, but no c/o side 

effects. he continued to report that he did not have any problems with mental 

illness and, in fact was here to psychoanalyze everyone in the world. he was 

observed talking to himself in his room during down times on the unit. he was 

expressing belief that he was being followed and monitored by the government 

because he was conducting some concerning research regarding mass killings from 

the [place].





the pt continued to deny any a/v hallucinations or si/hi. 





- Final Diagnosis (DSM 5)


Condition upon Discharge: STABLE


DSM 5: 


schizophrenia, paranoid


Disposition: HOME/ ROUTINE


Follow-up Treatment Plan: 


pt to f/u with the treatment team at [facility] [place]





- Smoking Cessation


Smoking Cessation Medication prescribed: No


Reason for not providing: does not smoke





- Antipsychotic Medications


Pt discharged on 2 or more routine antipsychotic medications: No

## 2017-03-18 NOTE — PCM.PYCHPN
Psychiatric Progress Note





- Psychiatric Progress Note


Patient seen today, length of contact: discussed with team


Patient Chief Complaint: 


do you think that xxxx(name of pt) looks jaundiced, i think you should check 

for hepatitis 


Problems Identified/Issues Discussed: 


pt is still refusing meds. he is intrusive with peers. spends his day in room 

talking to self. pt today is asking this writer to check other pt for hepatitis 

as he thinks they look jaundiced. he continues to lecture about psychoanalytic 

theory. 


Medication Change: No ( )


Medical Record Reviewed: Yes





Mental Status Examination





- Cognitive Function


Orientation: Person, Place, Situation


Memory: Intact


Attention: WNL


Concentration: WNL


Association: Loose


Fund of Knowledge: WNL


Decription of patient's judgement and insights: 


poor insight





- Mood


Mood: Anxious





- Affect


Affect: Blunted





- Speech


Speech: Appropriate





- Formal Thought Process


Formal Thought Process: Delusions, Paranoia, Loosening of associations, Flight 

of ideas





- Suicidal Ideation


Suicidal Ideation: No





- Homicidal Ideation


Homicidal Ideation: No





Goal/Treatment Plan





- Goal/Treatment Plan


Need for Continued Stay: Remain at risks for inpatient hospitalization, 

Discharge may exacerbated symptoms, Severe functional impairment, Other


Progress Toward Problem(s) and Goals/Treatment Plan: 


schizophrenia





needs further treatment


pt to transfer to [facility] when a bed available


continue niacin


Estimated Date of D/C: 03/17/17

## 2017-11-19 ENCOUNTER — INPATIENT (INPATIENT)
Facility: HOSPITAL | Age: 57
LOS: 14 days | Discharge: HOME | End: 2017-12-04
Attending: PSYCHIATRY & NEUROLOGY | Admitting: STUDENT IN AN ORGANIZED HEALTH CARE EDUCATION/TRAINING PROGRAM
Payer: MEDICAID

## 2017-11-19 ENCOUNTER — EMERGENCY (EMERGENCY)
Facility: HOSPITAL | Age: 57
LOS: 1 days | Discharge: HOME | End: 2017-11-19
Attending: EMERGENCY MEDICINE | Admitting: EMERGENCY MEDICINE
Payer: SELF-PAY

## 2017-11-19 VITALS
TEMPERATURE: 98 F | OXYGEN SATURATION: 99 % | SYSTOLIC BLOOD PRESSURE: 120 MMHG | DIASTOLIC BLOOD PRESSURE: 70 MMHG | HEART RATE: 94 BPM | RESPIRATION RATE: 16 BRPM

## 2017-11-19 VITALS — OXYGEN SATURATION: 100 % | HEIGHT: 70 IN | TEMPERATURE: 97 F | RESPIRATION RATE: 16 BRPM | WEIGHT: 205.03 LBS

## 2017-11-19 VITALS
HEART RATE: 71 BPM | OXYGEN SATURATION: 100 % | SYSTOLIC BLOOD PRESSURE: 129 MMHG | DIASTOLIC BLOOD PRESSURE: 66 MMHG | RESPIRATION RATE: 16 BRPM

## 2017-11-19 DIAGNOSIS — R41.82 ALTERED MENTAL STATUS, UNSPECIFIED: ICD-10-CM

## 2017-11-19 DIAGNOSIS — I25.10 ATHEROSCLEROTIC HEART DISEASE OF NATIVE CORONARY ARTERY WITHOUT ANGINA PECTORIS: ICD-10-CM

## 2017-11-19 DIAGNOSIS — F31.2 BIPOLAR DISORDER, CURRENT EPISODE MANIC SEVERE WITH PSYCHOTIC FEATURES: ICD-10-CM

## 2017-11-19 DIAGNOSIS — F31.9 BIPOLAR DISORDER, UNSPECIFIED: ICD-10-CM

## 2017-11-19 DIAGNOSIS — F30.9 MANIC EPISODE, UNSPECIFIED: ICD-10-CM

## 2017-11-19 LAB
ALBUMIN SERPL ELPH-MCNC: 3.7 G/DL — SIGNIFICANT CHANGE UP (ref 3.4–5)
ALP SERPL-CCNC: 40 U/L — SIGNIFICANT CHANGE UP (ref 40–120)
ALT FLD-CCNC: 48 U/L — HIGH (ref 12–42)
ANION GAP SERPL CALC-SCNC: 11 MMOL/L — SIGNIFICANT CHANGE UP (ref 9–16)
AST SERPL-CCNC: 56 U/L — HIGH (ref 15–37)
BILIRUB SERPL-MCNC: 0.5 MG/DL — SIGNIFICANT CHANGE UP (ref 0.2–1.2)
BUN SERPL-MCNC: 18 MG/DL — SIGNIFICANT CHANGE UP (ref 7–23)
CALCIUM SERPL-MCNC: 9.4 MG/DL — SIGNIFICANT CHANGE UP (ref 8.5–10.5)
CHLORIDE SERPL-SCNC: 106 MMOL/L — SIGNIFICANT CHANGE UP (ref 96–108)
CO2 SERPL-SCNC: 25 MMOL/L — SIGNIFICANT CHANGE UP (ref 22–31)
CREAT SERPL-MCNC: 0.72 MG/DL — SIGNIFICANT CHANGE UP (ref 0.5–1.3)
ETHANOL SERPL-MCNC: <3 MG/DL — SIGNIFICANT CHANGE UP
GLUCOSE SERPL-MCNC: 98 MG/DL — SIGNIFICANT CHANGE UP (ref 70–99)
HCT VFR BLD CALC: 41.2 % — SIGNIFICANT CHANGE UP (ref 39–50)
HGB BLD-MCNC: 13.7 G/DL — SIGNIFICANT CHANGE UP (ref 13–17)
MCHC RBC-ENTMCNC: 30.2 PG — SIGNIFICANT CHANGE UP (ref 27–34)
MCHC RBC-ENTMCNC: 33.3 G/DL — SIGNIFICANT CHANGE UP (ref 32–36)
MCV RBC AUTO: 90.9 FL — SIGNIFICANT CHANGE UP (ref 80–100)
PCP SPEC-MCNC: SIGNIFICANT CHANGE UP
PLATELET # BLD AUTO: 273 K/UL — SIGNIFICANT CHANGE UP (ref 150–400)
POTASSIUM SERPL-MCNC: 3.5 MMOL/L — SIGNIFICANT CHANGE UP (ref 3.5–5.3)
POTASSIUM SERPL-SCNC: 3.5 MMOL/L — SIGNIFICANT CHANGE UP (ref 3.5–5.3)
PROT SERPL-MCNC: 6.9 G/DL — SIGNIFICANT CHANGE UP (ref 6.4–8.2)
RBC # BLD: 4.53 M/UL — SIGNIFICANT CHANGE UP (ref 4.2–5.8)
RBC # FLD: 12.8 % — SIGNIFICANT CHANGE UP (ref 10.3–16.9)
SODIUM SERPL-SCNC: 142 MMOL/L — SIGNIFICANT CHANGE UP (ref 132–145)
TSH SERPL-MCNC: 1.51 UIU/ML — SIGNIFICANT CHANGE UP (ref 0.36–3.74)
WBC # BLD: 4.8 K/UL — SIGNIFICANT CHANGE UP (ref 3.8–10.5)
WBC # FLD AUTO: 4.8 K/UL — SIGNIFICANT CHANGE UP (ref 3.8–10.5)

## 2017-11-19 PROCEDURE — 99285 EMERGENCY DEPT VISIT HI MDM: CPT | Mod: 25

## 2017-11-19 PROCEDURE — 93010 ELECTROCARDIOGRAM REPORT: CPT

## 2017-11-19 PROCEDURE — 70450 CT HEAD/BRAIN W/O DYE: CPT | Mod: 26

## 2017-11-19 PROCEDURE — 90792 PSYCH DIAG EVAL W/MED SRVCS: CPT | Mod: GT

## 2017-11-19 PROCEDURE — 99053 MED SERV 10PM-8AM 24 HR FAC: CPT

## 2017-11-19 RX ORDER — DIPHENHYDRAMINE HCL 50 MG
50 CAPSULE ORAL EVERY 4 HOURS
Qty: 0 | Refills: 0 | Status: DISCONTINUED | OUTPATIENT
Start: 2017-11-19 | End: 2017-12-04

## 2017-11-19 RX ORDER — DIPHENHYDRAMINE HCL 50 MG
50 CAPSULE ORAL EVERY 6 HOURS
Qty: 0 | Refills: 0 | Status: DISCONTINUED | OUTPATIENT
Start: 2017-11-19 | End: 2017-11-22

## 2017-11-19 RX ORDER — THIAMINE MONONITRATE (VIT B1) 100 MG
100 TABLET ORAL DAILY
Qty: 0 | Refills: 0 | Status: DISCONTINUED | OUTPATIENT
Start: 2017-11-19 | End: 2017-12-04

## 2017-11-19 RX ORDER — FOLIC ACID 0.8 MG
1 TABLET ORAL DAILY
Qty: 0 | Refills: 0 | Status: DISCONTINUED | OUTPATIENT
Start: 2017-11-19 | End: 2017-12-04

## 2017-11-19 RX ORDER — DIPHENHYDRAMINE HCL 50 MG
50 CAPSULE ORAL EVERY 6 HOURS
Qty: 0 | Refills: 0 | Status: DISCONTINUED | OUTPATIENT
Start: 2017-11-19 | End: 2017-12-04

## 2017-11-19 RX ORDER — HALOPERIDOL DECANOATE 100 MG/ML
5 INJECTION INTRAMUSCULAR EVERY 6 HOURS
Qty: 0 | Refills: 0 | Status: DISCONTINUED | OUTPATIENT
Start: 2017-11-19 | End: 2017-12-04

## 2017-11-19 RX ORDER — HALOPERIDOL DECANOATE 100 MG/ML
5 INJECTION INTRAMUSCULAR ONCE
Qty: 0 | Refills: 0 | Status: COMPLETED | OUTPATIENT
Start: 2017-11-19 | End: 2017-11-19

## 2017-11-19 RX ORDER — HALOPERIDOL DECANOATE 100 MG/ML
5 INJECTION INTRAMUSCULAR ONCE
Qty: 0 | Refills: 0 | Status: DISCONTINUED | OUTPATIENT
Start: 2017-11-19 | End: 2017-12-04

## 2017-11-19 RX ORDER — ASPIRIN/CALCIUM CARB/MAGNESIUM 324 MG
81 TABLET ORAL DAILY
Qty: 0 | Refills: 0 | Status: DISCONTINUED | OUTPATIENT
Start: 2017-11-19 | End: 2017-12-04

## 2017-11-19 RX ORDER — CLOPIDOGREL BISULFATE 75 MG/1
75 TABLET, FILM COATED ORAL DAILY
Qty: 0 | Refills: 0 | Status: DISCONTINUED | OUTPATIENT
Start: 2017-11-19 | End: 2017-11-19

## 2017-11-19 RX ORDER — RISPERIDONE 4 MG/1
2 TABLET ORAL AT BEDTIME
Qty: 0 | Refills: 0 | Status: DISCONTINUED | OUTPATIENT
Start: 2017-11-19 | End: 2017-11-20

## 2017-11-19 RX ADMIN — Medication 50 MILLIGRAM(S): at 14:50

## 2017-11-19 RX ADMIN — Medication 2 MILLIGRAM(S): at 14:50

## 2017-11-19 RX ADMIN — HALOPERIDOL DECANOATE 5 MILLIGRAM(S): 100 INJECTION INTRAMUSCULAR at 14:50

## 2017-11-19 RX ADMIN — RISPERIDONE 2 MILLIGRAM(S): 4 TABLET ORAL at 22:47

## 2017-11-19 NOTE — ED PROVIDER NOTE - OBJECTIVE STATEMENT
PMHx CAD s/p CABG and stent BIB EMS for AMS. patient PMHx CAD s/p CABG and stent BIB EMS for AMS. As per triage note patient was found dancing and singing by EMS by a post office. patient has not complaints. admits to hitting his head in the ambulance ride over. states that he came to [place] to bring people jordan. does not follow up with any PCP. had stopped seeing his cardiologist Dr Salamanca earlier this year. states that he had seen a psychiatrist in 1993 to help a girlfriend at the time that was having worsening bipolar d/o. denies history of psych admission. denies any suicidal or homicidal ideation

## 2017-11-19 NOTE — ED PROVIDER NOTE - MEDICAL DECISION MAKING DETAILS
patient BIB EMS for AMS denies ETOH, recreational drug use exhibiting bizzare behavior in ED. will check labs, do ekg, CT and consult psych patient BIB EMS for AMS denies ETOH, recreational drug use exhibiting bizarre behavior in ED. will check labs, do ekg, CT and consult psych

## 2017-11-19 NOTE — ED ADULT TRIAGE NOTE - CHIEF COMPLAINT QUOTE
Pt brought in by EMS after pt was found dancing in the post office. Pt singing at triage and acting strangely. Pt unable to tell if he took drugs or alcohol.

## 2017-11-19 NOTE — ED PROVIDER NOTE - ATTENDING CONTRIBUTION TO CARE
Patient brought in by EMS for bizarre behavior, dancing in the street. VSS. On exam is grossly tangential, disorganized and appears manic. Seen by psych and accepted for admission to [facility].

## 2017-11-19 NOTE — ED PROVIDER NOTE - PROGRESS NOTE DETAILS
Dr Salamanca patient's cardiologist called back, patient has no known history of psych d/o. spoke to telepsych, patient to be transferred to [facility] under Dr. Williamson for voluntary psych admission

## 2017-11-19 NOTE — ED BEHAVIORAL HEALTH ASSESSMENT NOTE - SUMMARY
57 y o  male, domiciled alone in [place], hx of bipolar d/o, prior hospitalizations, denies hx of suicidality/violence/legal hx; pmhx of CAD s/p CABG in 2017, unclear substance use hx (pt states he drinks on occasion but refuses to be specific), who is bib ems after being found singing and dancing at the post office near [facility].  on evaluation, pt is pressured, difficult to interrupt, tangential, with flight of ideas, and grandiose and bizarre thinking.  he meets criteria for a manic episode with psychotic features, and is a danger to self and others.  he will be admitted for safety and stabilization.

## 2017-11-19 NOTE — ED BEHAVIORAL HEALTH ASSESSMENT NOTE - HPI (INCLUDE ILLNESS QUALITY, SEVERITY, DURATION, TIMING, CONTEXT, MODIFYING FACTORS, ASSOCIATED SIGNS AND SYMPTOMS)
57 y o  male, domiciled alone in [place], hx of bipolar d/o, prior hospitalizations, denies hx of suicidality/violence/legal hx; pmhx of CAD s/p CABG in 2017, unclear substance use hx (pt states he drinks on occasion but refuses to be specific), who is bib ems after being found singing and dancing at the post office near [facility].  Patient is pressured, difficult to interrupt, tangential and grandiose. He states he lives alone in [place], but he may have lost his apt. after two days ago, when he began to break lamps and windows in his apt. in an effort to build a  canoe of some sort. He left his apt. and admits to throwing his "keys in the [place]," and came to [place]. He states he spent Friday at an [place] [employer] with Queen Carrie, who was "cyndie." He states he left his job at [employer] three years ago, and is now working on a "machine version of macropsychoanalysis". He states he was involuntarily hospitalized at [facility] this year, and treated with Risperdal, but he currently does not take medications or see a psychiatrist.  He states he cured himself by curing himself of "bad coding." He states his lawye at [facility] r was Hector Guadalupe and he fought his involuntary commitment. He is waiving around ten dollar bills and stating they are God's magic wand.   He cannot provide any collateral contacts, staying he has no connections to anybody.  He is not clear in the amount or extent of his alcohol use, though he does state he drank Jamisions whiskey two days ago. He 57 y o  male, domiciled alone in [place], hx of bipolar d/o, prior hospitalizations, denies hx of suicidality/violence/legal hx; pmhx of CAD s/p CABG in 2017, unclear substance use hx (pt states he drinks on occasion but refuses to be specific), who is bib ems after being found singing and dancing at the post office near [facility].  Patient is pressured, difficult to interrupt, tangential and grandiose. He states he lives alone in [place], but he may have lost his apt. after two days ago, when he began to break lamps and windows in his apt. in an effort to build a  canoe of some sort. He left his apt. and admits to throwing his "keys in the [place]," and came to [place]. He states he spent Friday at an [employer] [employer] with [place] Carrie, who was "cyndie." He states he left his job at [employer] three years ago, and is now working on a "machine version of macropsychoanalysis". He states he was involuntarily hospitalized at [facility] this year, and treated with Risperdal, but he currently does not take medications or see a psychiatrist.  He states he cured himself by ridding himself of "bad coding." He states his  at [facility] was Hector Guadalupe and he fought his involuntary commitment. He is waiving around ten dollar bills and stating they are God's magic wand.   He cannot provide any collateral contacts, stating he has no connections to anybody.  He is not clear in the amount or extent of his alcohol use, though he does state he drank Jamisions whiskey two days ago. He denies current si/hi, and denies any prior suicide attempts.   He denies hearing voices but does say he communicates with God.

## 2017-11-19 NOTE — ED PROVIDER NOTE - PHYSICAL EXAMINATION
speech is tangential, talking about aliens, walking around ED waving around a 10 dollar bill speech is tangential, talking about aliens, god, constellations. walking around ED waving around a 10 dollar bill

## 2017-11-19 NOTE — ED BEHAVIORAL HEALTH ASSESSMENT NOTE - RISK ASSESSMENT
high acute risk. manic, psychotic, unable to care for himself, very poor judgment, noncompliant with medications/treatment, unclear amount/extent of alcohol use, seemingly new homelessness

## 2017-11-19 NOTE — ED BEHAVIORAL HEALTH ASSESSMENT NOTE - PSYCHIATRIC ISSUES AND PLAN (INCLUDE STANDING AND PRN MEDICATION)
pt would benefit from a mood stabilizer and atypical antipsychotic (unclear medication or medication response hx); haldol/ativan prn

## 2017-11-20 DIAGNOSIS — I25.10 ATHEROSCLEROTIC HEART DISEASE OF NATIVE CORONARY ARTERY WITHOUT ANGINA PECTORIS: ICD-10-CM

## 2017-11-20 DIAGNOSIS — E78.5 HYPERLIPIDEMIA, UNSPECIFIED: ICD-10-CM

## 2017-11-20 DIAGNOSIS — I10 ESSENTIAL (PRIMARY) HYPERTENSION: ICD-10-CM

## 2017-11-20 PROCEDURE — 93010 ELECTROCARDIOGRAM REPORT: CPT

## 2017-11-20 PROCEDURE — 99223 1ST HOSP IP/OBS HIGH 75: CPT

## 2017-11-20 PROCEDURE — 99232 SBSQ HOSP IP/OBS MODERATE 35: CPT

## 2017-11-20 RX ORDER — METOPROLOL TARTRATE 50 MG
12.5 TABLET ORAL
Qty: 0 | Refills: 0 | Status: DISCONTINUED | OUTPATIENT
Start: 2017-11-20 | End: 2017-12-04

## 2017-11-20 RX ORDER — RISPERIDONE 4 MG/1
3 TABLET ORAL AT BEDTIME
Qty: 0 | Refills: 0 | Status: DISCONTINUED | OUTPATIENT
Start: 2017-11-20 | End: 2017-11-21

## 2017-11-20 RX ORDER — DIVALPROEX SODIUM 500 MG/1
1000 TABLET, DELAYED RELEASE ORAL AT BEDTIME
Qty: 0 | Refills: 0 | Status: DISCONTINUED | OUTPATIENT
Start: 2017-11-20 | End: 2017-11-24

## 2017-11-20 RX ADMIN — RISPERIDONE 3 MILLIGRAM(S): 4 TABLET ORAL at 21:44

## 2017-11-20 RX ADMIN — Medication 12.5 MILLIGRAM(S): at 21:44

## 2017-11-20 RX ADMIN — DIVALPROEX SODIUM 1000 MILLIGRAM(S): 500 TABLET, DELAYED RELEASE ORAL at 21:44

## 2017-11-20 NOTE — CONSULT NOTE ADULT - PROBLEM SELECTOR RECOMMENDATION 9
unclear details of hx but noted with CABG scar. primary team to determine home medications from cardiologist. fro now would cw ASA, Plavix, will add low dose BB with hold parameters. please obtain baseline EKG

## 2017-11-20 NOTE — CONSULT NOTE ADULT - SUBJECTIVE AND OBJECTIVE BOX
CC: CAD    HPI: 57 M with PMHx of CAD, s/p stent and then CABG, HTN, HLD who is currently at [facility] for psych management being seen for medical management patient is a poor historian has flight of idea, therefore limited information. he states that he follows with a cardiologist name Dr Salamanca and last saw him in 2/17. he states that he was diagnosed with CAD and initially had stent placement and then within a month need double bypass. he states that he was on niacin, a statin, metoprolol but he weaned himself off all meds. he can not recall what the doses were or what other medications he was taking,. he currently denies CP, SOB, palpitations or LOC. patient was started here on ASA and Plavix but no med reconciliation is available.     PMHX: CAD, asthma    social Hx: NC    SHX: CABG    FHX: NC    Allergies: NKDA  +Seasonal Allergies     ROS:  No HA/DZ  No Vision changes   No CP, SOB  No N/V/D  No Edema  No Rash  NO weakness, numbness    MEDICATIONS  (STANDING):  aspirin  chewable 81 milliGRAM(s) Oral daily  diVALproex ER 1000 milliGRAM(s) Oral at bedtime  folic acid 1 milliGRAM(s) Oral daily  risperiDONE   Solution 3 milliGRAM(s) Oral at bedtime  thiamine 100 milliGRAM(s) Oral daily    MEDICATIONS  (PRN):  diphenhydrAMINE   Capsule 50 milliGRAM(s) Oral every 4 hours PRN agitation  diphenhydrAMINE   Injectable 50 milliGRAM(s) IntraMuscular every 6 hours PRN Extrapyramidal prophylaxis  diphenhydrAMINE   Injectable 50 milliGRAM(s) IntraMuscular every 6 hours PRN Extrapyramidal prophylaxis  haloperidol     Tablet 5 milliGRAM(s) Oral every 6 hours PRN agitation  haloperidol    Injectable 5 milliGRAM(s) IntraMuscular once PRN severe agitation  LORazepam     Tablet 2 milliGRAM(s) Oral every 6 hours PRN Agitation  LORazepam     Tablet 2 milliGRAM(s) Oral every 2 hours PRN CIWA greater than 8 or increase in CIWA by 2 or more  LORazepam   Injectable 2 milliGRAM(s) IntraMuscular once PRN severe agitation      T(C): 36.7 (11-20-17 @ 08:04)  HR: --  BP: --  RR: 17 (11-20-17 @ 08:04)  SpO2: 100% (11-19-17 @ 22:15)  CAPILLARY BLOOD GLUCOSE        I&O's Summary      PHYSICAL EXAM:  GENERAL: NAD, well-developed, AOx3  HEAD:  Atraumatic, Normocephalic  EYES: EOMI, PERRL, conjunctiva and sclera clear  NECK: Supple, No JVD  CHEST/LUNG: Clear to auscultation bilaterally, CABG scar   HEART: Regular rate and rhythm; No murmurs, rubs, or gallops, No Edema  ABDOMEN: Soft, Nontender, Nondistended; Bowel sounds present  EXTREMITIES:  2+ Peripheral Pulses, No clubbing, cyanosis  PSYCH: No SI/HI  NEUROLOGY: non-focal  SKIN: No rashes or lesions    LABS:                        13.7   4.8   )-----------( 273      ( 19 Nov 2017 08:55 )             41.2     11-19    142  |  106  |  18  ----------------------------<  98  3.5   |  25  |  0.72    Ca    9.4      19 Nov 2017 08:55    TPro  6.9  /  Alb  3.7  /  TBili  0.5  /  DBili  x   /  AST  56<H>  /  ALT  48<H>  /  AlkPhos  40  11-19                  RADIOLOGY & ADDITIONAL TESTS:    Imaging Personally Reviewed: CTH: no acute changes     Consultant(s) Notes Reviewed:      Care Discussed with Consultants/Other Providers:

## 2017-11-20 NOTE — CONSULT NOTE ADULT - PROBLEM SELECTOR RECOMMENDATION 3
please check fasting lipids, would benefit from statin therapy based on CAD hx  and BMI. will hold off on Statin as noted with mild transaminitis until home regimen is confirmed and repeat LFTs are stable.  Check A1c as well

## 2017-11-20 NOTE — CONSULT NOTE ADULT - PROBLEM SELECTOR RECOMMENDATION 2
currently controlled, but would benefit from BB in setting of CAD if BP tolerates. will add until home regimen is clarified

## 2017-11-21 DIAGNOSIS — R73.03 PREDIABETES: ICD-10-CM

## 2017-11-21 LAB
ALBUMIN SERPL ELPH-MCNC: 4 G/DL — SIGNIFICANT CHANGE UP (ref 3.3–5)
ALP SERPL-CCNC: 38 U/L — LOW (ref 40–120)
ALT FLD-CCNC: 35 U/L — SIGNIFICANT CHANGE UP (ref 4–41)
AST SERPL-CCNC: 45 U/L — HIGH (ref 4–40)
BILIRUB DIRECT SERPL-MCNC: 0.1 MG/DL — SIGNIFICANT CHANGE UP (ref 0.1–0.2)
BILIRUB SERPL-MCNC: 0.4 MG/DL — SIGNIFICANT CHANGE UP (ref 0.2–1.2)
CHOLEST SERPL-MCNC: 176 MG/DL — SIGNIFICANT CHANGE UP (ref 120–199)
HBA1C BLD-MCNC: 6 % — HIGH (ref 4–5.6)
HDLC SERPL-MCNC: 45 MG/DL — SIGNIFICANT CHANGE UP (ref 35–55)
LIPID PNL WITH DIRECT LDL SERPL: 110 MG/DL — SIGNIFICANT CHANGE UP
PROT SERPL-MCNC: 6.6 G/DL — SIGNIFICANT CHANGE UP (ref 6–8.3)
TRIGL SERPL-MCNC: 126 MG/DL — SIGNIFICANT CHANGE UP (ref 10–149)

## 2017-11-21 PROCEDURE — 99233 SBSQ HOSP IP/OBS HIGH 50: CPT

## 2017-11-21 PROCEDURE — 99232 SBSQ HOSP IP/OBS MODERATE 35: CPT

## 2017-11-21 RX ORDER — RISPERIDONE 4 MG/1
4 TABLET ORAL AT BEDTIME
Qty: 0 | Refills: 0 | Status: DISCONTINUED | OUTPATIENT
Start: 2017-11-21 | End: 2017-11-27

## 2017-11-21 RX ADMIN — Medication 12.5 MILLIGRAM(S): at 21:27

## 2017-11-21 RX ADMIN — Medication 12.5 MILLIGRAM(S): at 10:23

## 2017-11-21 RX ADMIN — Medication 1 MILLIGRAM(S): at 09:46

## 2017-11-21 RX ADMIN — Medication 81 MILLIGRAM(S): at 09:46

## 2017-11-21 RX ADMIN — Medication 100 MILLIGRAM(S): at 09:46

## 2017-11-21 RX ADMIN — DIVALPROEX SODIUM 1000 MILLIGRAM(S): 500 TABLET, DELAYED RELEASE ORAL at 21:27

## 2017-11-21 RX ADMIN — RISPERIDONE 4 MILLIGRAM(S): 4 TABLET ORAL at 21:28

## 2017-11-21 NOTE — PROGRESS NOTE ADULT - PROBLEM SELECTOR PLAN 4
A1C noted, diet control for now. can consider metformin 500mg PO BID if dietary compliance is an issue for this patient.

## 2017-11-21 NOTE — PROGRESS NOTE ADULT - SUBJECTIVE AND OBJECTIVE BOX
Patient is a 57y old  Male who presents with a chief complaint of CAD    SUBJECTIVE / OVERNIGHT EVENTS: no events or new complaints     ROS:  No HA/DZ  No Vision changes   No CP, SOB  No N/V/D  No Edema  No Rash  NO weakness, numbness    MEDICATIONS  (STANDING):  aspirin  chewable 81 milliGRAM(s) Oral daily  diVALproex ER 1000 milliGRAM(s) Oral at bedtime  folic acid 1 milliGRAM(s) Oral daily  metoprolol     tartrate 12.5 milliGRAM(s) Oral two times a day  risperiDONE   Solution 4 milliGRAM(s) Oral at bedtime  thiamine 100 milliGRAM(s) Oral daily    MEDICATIONS  (PRN):  diphenhydrAMINE   Capsule 50 milliGRAM(s) Oral every 4 hours PRN agitation  diphenhydrAMINE   Injectable 50 milliGRAM(s) IntraMuscular every 6 hours PRN Extrapyramidal prophylaxis  diphenhydrAMINE   Injectable 50 milliGRAM(s) IntraMuscular every 6 hours PRN Extrapyramidal prophylaxis  haloperidol     Tablet 5 milliGRAM(s) Oral every 6 hours PRN agitation  haloperidol    Injectable 5 milliGRAM(s) IntraMuscular once PRN severe agitation  LORazepam     Tablet 2 milliGRAM(s) Oral every 6 hours PRN Agitation  LORazepam     Tablet 2 milliGRAM(s) Oral every 2 hours PRN CIWA greater than 8 or increase in CIWA by 2 or more  LORazepam   Injectable 2 milliGRAM(s) IntraMuscular once PRN severe agitation      T(C): 36.7 (11-21-17 @ 08:30)  HR: 81 (11-21-17 @ 08:30)  BP: 100/64 (11-21-17 @ 08:30)  RR: --  SpO2: --  CAPILLARY BLOOD GLUCOSE        I&O's Summary      PHYSICAL EXAM:  GENERAL: NAD, well-developed, AOx3  HEAD:  Atraumatic, Normocephalic  EYES: EOMI, PERRL, conjunctiva and sclera clear  NECK: Supple, No JVD  CHEST/LUNG: CABG scar, Clear to auscultation bilaterally  HEART: Regular rate and rhythm; No murmurs, rubs, or gallops, No Edema  ABDOMEN: Soft, Nontender, Nondistended; Bowel sounds present  EXTREMITIES:  2+ Peripheral Pulses, No clubbing, cyanosis  PSYCH: No SI/HI  NEUROLOGY: non-focal  SKIN: No rashes or lesions    LABS:        TPro  6.6  /  Alb  4.0  /  TBili  0.4  /  DBili  0.1  /  AST  45<H>  /  ALT  35  /  AlkPhos  38<L>  11-21                  RADIOLOGY & ADDITIONAL TESTS:    Imaging Personally Reviewed:    Consultant(s) Notes Reviewed:      Care Discussed with Consultants/Other Providers: psych, Dr Gross

## 2017-11-21 NOTE — PROGRESS NOTE ADULT - PROBLEM SELECTOR PLAN 3
lipid panel noted. LDL goal for this patient is 70, therefore he would benefit from statin therapy. mild transaminitis now resolving. I would hold off on Statin right now until home regimen is clarified as this could be transaminitis due to statin and I would consider another statin than home regimen.

## 2017-11-22 PROCEDURE — 99232 SBSQ HOSP IP/OBS MODERATE 35: CPT

## 2017-11-22 RX ADMIN — Medication 12.5 MILLIGRAM(S): at 08:34

## 2017-11-22 RX ADMIN — Medication 81 MILLIGRAM(S): at 08:34

## 2017-11-22 RX ADMIN — DIVALPROEX SODIUM 1000 MILLIGRAM(S): 500 TABLET, DELAYED RELEASE ORAL at 22:02

## 2017-11-22 RX ADMIN — Medication 12.5 MILLIGRAM(S): at 22:02

## 2017-11-22 RX ADMIN — Medication 1 MILLIGRAM(S): at 08:34

## 2017-11-22 RX ADMIN — Medication 100 MILLIGRAM(S): at 08:34

## 2017-11-22 RX ADMIN — RISPERIDONE 4 MILLIGRAM(S): 4 TABLET ORAL at 22:02

## 2017-11-23 PROCEDURE — 99232 SBSQ HOSP IP/OBS MODERATE 35: CPT

## 2017-11-23 RX ADMIN — RISPERIDONE 4 MILLIGRAM(S): 4 TABLET ORAL at 21:25

## 2017-11-23 RX ADMIN — Medication 1 MILLIGRAM(S): at 09:26

## 2017-11-23 RX ADMIN — DIVALPROEX SODIUM 1000 MILLIGRAM(S): 500 TABLET, DELAYED RELEASE ORAL at 21:25

## 2017-11-23 RX ADMIN — Medication 81 MILLIGRAM(S): at 09:26

## 2017-11-23 RX ADMIN — Medication 12.5 MILLIGRAM(S): at 09:26

## 2017-11-23 RX ADMIN — Medication 12.5 MILLIGRAM(S): at 21:25

## 2017-11-23 RX ADMIN — Medication 100 MILLIGRAM(S): at 09:26

## 2017-11-24 LAB
HCT VFR BLD CALC: 43.7 % — SIGNIFICANT CHANGE UP (ref 39–50)
HGB BLD-MCNC: 14.7 G/DL — SIGNIFICANT CHANGE UP (ref 13–17)
MCHC RBC-ENTMCNC: 31.4 PG — SIGNIFICANT CHANGE UP (ref 27–34)
MCHC RBC-ENTMCNC: 33.6 % — SIGNIFICANT CHANGE UP (ref 32–36)
MCV RBC AUTO: 93.4 FL — SIGNIFICANT CHANGE UP (ref 80–100)
NRBC # FLD: 0 — SIGNIFICANT CHANGE UP
PLATELET # BLD AUTO: 310 K/UL — SIGNIFICANT CHANGE UP (ref 150–400)
PMV BLD: 9.9 FL — SIGNIFICANT CHANGE UP (ref 7–13)
RBC # BLD: 4.68 M/UL — SIGNIFICANT CHANGE UP (ref 4.2–5.8)
RBC # FLD: 12.7 % — SIGNIFICANT CHANGE UP (ref 10.3–14.5)
VALPROATE SERPL-MCNC: 56.8 UG/ML — SIGNIFICANT CHANGE UP (ref 50–100)
WBC # BLD: 4.67 K/UL — SIGNIFICANT CHANGE UP (ref 3.8–10.5)
WBC # FLD AUTO: 4.67 K/UL — SIGNIFICANT CHANGE UP (ref 3.8–10.5)

## 2017-11-24 PROCEDURE — 99232 SBSQ HOSP IP/OBS MODERATE 35: CPT

## 2017-11-24 RX ORDER — DIVALPROEX SODIUM 500 MG/1
1500 TABLET, DELAYED RELEASE ORAL AT BEDTIME
Qty: 0 | Refills: 0 | Status: DISCONTINUED | OUTPATIENT
Start: 2017-11-24 | End: 2017-12-04

## 2017-11-24 RX ADMIN — Medication 100 MILLIGRAM(S): at 09:53

## 2017-11-24 RX ADMIN — Medication 12.5 MILLIGRAM(S): at 09:53

## 2017-11-24 RX ADMIN — Medication 1 MILLIGRAM(S): at 09:52

## 2017-11-24 RX ADMIN — Medication 81 MILLIGRAM(S): at 09:52

## 2017-11-24 RX ADMIN — RISPERIDONE 4 MILLIGRAM(S): 4 TABLET ORAL at 21:43

## 2017-11-24 RX ADMIN — Medication 12.5 MILLIGRAM(S): at 21:43

## 2017-11-24 RX ADMIN — DIVALPROEX SODIUM 1500 MILLIGRAM(S): 500 TABLET, DELAYED RELEASE ORAL at 21:43

## 2017-11-25 RX ADMIN — Medication 12.5 MILLIGRAM(S): at 09:51

## 2017-11-25 RX ADMIN — DIVALPROEX SODIUM 1500 MILLIGRAM(S): 500 TABLET, DELAYED RELEASE ORAL at 20:52

## 2017-11-25 RX ADMIN — Medication 1 MILLIGRAM(S): at 09:51

## 2017-11-25 RX ADMIN — Medication 12.5 MILLIGRAM(S): at 21:43

## 2017-11-25 RX ADMIN — RISPERIDONE 4 MILLIGRAM(S): 4 TABLET ORAL at 21:43

## 2017-11-25 RX ADMIN — Medication 100 MILLIGRAM(S): at 09:51

## 2017-11-25 RX ADMIN — Medication 81 MILLIGRAM(S): at 09:51

## 2017-11-26 RX ADMIN — DIVALPROEX SODIUM 1500 MILLIGRAM(S): 500 TABLET, DELAYED RELEASE ORAL at 22:03

## 2017-11-26 RX ADMIN — Medication 12.5 MILLIGRAM(S): at 08:51

## 2017-11-26 RX ADMIN — Medication 12.5 MILLIGRAM(S): at 22:03

## 2017-11-26 RX ADMIN — Medication 100 MILLIGRAM(S): at 08:51

## 2017-11-26 RX ADMIN — Medication 81 MILLIGRAM(S): at 08:51

## 2017-11-26 RX ADMIN — Medication 1 MILLIGRAM(S): at 08:51

## 2017-11-26 RX ADMIN — RISPERIDONE 4 MILLIGRAM(S): 4 TABLET ORAL at 22:03

## 2017-11-27 PROCEDURE — 99232 SBSQ HOSP IP/OBS MODERATE 35: CPT

## 2017-11-27 RX ORDER — RISPERIDONE 4 MG/1
3 TABLET ORAL AT BEDTIME
Qty: 0 | Refills: 0 | Status: DISCONTINUED | OUTPATIENT
Start: 2017-11-27 | End: 2017-11-28

## 2017-11-27 RX ORDER — OLANZAPINE 15 MG/1
5 TABLET, FILM COATED ORAL AT BEDTIME
Qty: 0 | Refills: 0 | Status: DISCONTINUED | OUTPATIENT
Start: 2017-11-27 | End: 2017-11-28

## 2017-11-27 RX ADMIN — DIVALPROEX SODIUM 1500 MILLIGRAM(S): 500 TABLET, DELAYED RELEASE ORAL at 21:35

## 2017-11-27 RX ADMIN — OLANZAPINE 5 MILLIGRAM(S): 15 TABLET, FILM COATED ORAL at 21:35

## 2017-11-27 RX ADMIN — Medication 100 MILLIGRAM(S): at 09:19

## 2017-11-27 RX ADMIN — Medication 1 MILLIGRAM(S): at 09:19

## 2017-11-27 RX ADMIN — Medication 81 MILLIGRAM(S): at 09:19

## 2017-11-27 RX ADMIN — Medication 12.5 MILLIGRAM(S): at 21:35

## 2017-11-27 RX ADMIN — RISPERIDONE 3 MILLIGRAM(S): 4 TABLET ORAL at 21:35

## 2017-11-27 RX ADMIN — Medication 12.5 MILLIGRAM(S): at 09:19

## 2017-11-28 PROCEDURE — 99232 SBSQ HOSP IP/OBS MODERATE 35: CPT

## 2017-11-28 RX ORDER — OLANZAPINE 15 MG/1
10 TABLET, FILM COATED ORAL AT BEDTIME
Qty: 0 | Refills: 0 | Status: DISCONTINUED | OUTPATIENT
Start: 2017-11-28 | End: 2017-12-04

## 2017-11-28 RX ORDER — RISPERIDONE 4 MG/1
2 TABLET ORAL AT BEDTIME
Qty: 0 | Refills: 0 | Status: DISCONTINUED | OUTPATIENT
Start: 2017-11-28 | End: 2017-11-29

## 2017-11-28 RX ADMIN — Medication 1 MILLIGRAM(S): at 09:17

## 2017-11-28 RX ADMIN — Medication 100 MILLIGRAM(S): at 09:17

## 2017-11-28 RX ADMIN — RISPERIDONE 2 MILLIGRAM(S): 4 TABLET ORAL at 21:22

## 2017-11-28 RX ADMIN — DIVALPROEX SODIUM 1500 MILLIGRAM(S): 500 TABLET, DELAYED RELEASE ORAL at 21:22

## 2017-11-28 RX ADMIN — Medication 12.5 MILLIGRAM(S): at 09:17

## 2017-11-28 RX ADMIN — Medication 81 MILLIGRAM(S): at 09:15

## 2017-11-28 RX ADMIN — Medication 12.5 MILLIGRAM(S): at 21:22

## 2017-11-28 RX ADMIN — OLANZAPINE 10 MILLIGRAM(S): 15 TABLET, FILM COATED ORAL at 21:22

## 2017-11-29 LAB — VALPROATE SERPL-MCNC: 104.1 UG/ML — HIGH (ref 50–100)

## 2017-11-29 PROCEDURE — 99232 SBSQ HOSP IP/OBS MODERATE 35: CPT

## 2017-11-29 RX ADMIN — Medication 12.5 MILLIGRAM(S): at 09:11

## 2017-11-29 RX ADMIN — OLANZAPINE 10 MILLIGRAM(S): 15 TABLET, FILM COATED ORAL at 21:59

## 2017-11-29 RX ADMIN — Medication 1 MILLIGRAM(S): at 09:11

## 2017-11-29 RX ADMIN — Medication 12.5 MILLIGRAM(S): at 21:59

## 2017-11-29 RX ADMIN — Medication 81 MILLIGRAM(S): at 09:11

## 2017-11-29 RX ADMIN — DIVALPROEX SODIUM 1500 MILLIGRAM(S): 500 TABLET, DELAYED RELEASE ORAL at 21:59

## 2017-11-29 RX ADMIN — Medication 100 MILLIGRAM(S): at 09:11

## 2017-11-30 PROCEDURE — 99232 SBSQ HOSP IP/OBS MODERATE 35: CPT

## 2017-11-30 RX ADMIN — DIVALPROEX SODIUM 1500 MILLIGRAM(S): 500 TABLET, DELAYED RELEASE ORAL at 21:20

## 2017-11-30 RX ADMIN — Medication 12.5 MILLIGRAM(S): at 10:26

## 2017-11-30 RX ADMIN — Medication 81 MILLIGRAM(S): at 10:26

## 2017-11-30 RX ADMIN — OLANZAPINE 10 MILLIGRAM(S): 15 TABLET, FILM COATED ORAL at 21:20

## 2017-11-30 RX ADMIN — Medication 1 MILLIGRAM(S): at 10:26

## 2017-11-30 RX ADMIN — Medication 100 MILLIGRAM(S): at 10:26

## 2017-11-30 RX ADMIN — Medication 12.5 MILLIGRAM(S): at 21:20

## 2017-12-01 PROCEDURE — 99232 SBSQ HOSP IP/OBS MODERATE 35: CPT

## 2017-12-01 RX ADMIN — Medication 1 MILLIGRAM(S): at 09:29

## 2017-12-01 RX ADMIN — Medication 100 MILLIGRAM(S): at 09:29

## 2017-12-01 RX ADMIN — Medication 81 MILLIGRAM(S): at 09:29

## 2017-12-01 RX ADMIN — OLANZAPINE 10 MILLIGRAM(S): 15 TABLET, FILM COATED ORAL at 22:07

## 2017-12-01 RX ADMIN — Medication 12.5 MILLIGRAM(S): at 22:07

## 2017-12-01 RX ADMIN — DIVALPROEX SODIUM 1500 MILLIGRAM(S): 500 TABLET, DELAYED RELEASE ORAL at 22:07

## 2017-12-01 RX ADMIN — Medication 12.5 MILLIGRAM(S): at 09:29

## 2017-12-02 RX ADMIN — Medication 81 MILLIGRAM(S): at 09:45

## 2017-12-02 RX ADMIN — Medication 12.5 MILLIGRAM(S): at 21:55

## 2017-12-02 RX ADMIN — DIVALPROEX SODIUM 1500 MILLIGRAM(S): 500 TABLET, DELAYED RELEASE ORAL at 21:55

## 2017-12-02 RX ADMIN — Medication 100 MILLIGRAM(S): at 09:45

## 2017-12-02 RX ADMIN — Medication 12.5 MILLIGRAM(S): at 09:45

## 2017-12-02 RX ADMIN — OLANZAPINE 10 MILLIGRAM(S): 15 TABLET, FILM COATED ORAL at 21:55

## 2017-12-02 RX ADMIN — Medication 1 MILLIGRAM(S): at 09:45

## 2017-12-03 RX ORDER — OLANZAPINE 15 MG/1
1 TABLET, FILM COATED ORAL
Qty: 14 | Refills: 0 | OUTPATIENT
Start: 2017-12-03 | End: 2017-12-17

## 2017-12-03 RX ORDER — THIAMINE MONONITRATE (VIT B1) 100 MG
1 TABLET ORAL
Qty: 14 | Refills: 0 | OUTPATIENT
Start: 2017-12-03 | End: 2017-12-17

## 2017-12-03 RX ORDER — FOLIC ACID 0.8 MG
1 TABLET ORAL
Qty: 14 | Refills: 0 | OUTPATIENT
Start: 2017-12-03 | End: 2017-12-17

## 2017-12-03 RX ORDER — ASPIRIN/CALCIUM CARB/MAGNESIUM 324 MG
1 TABLET ORAL
Qty: 14 | Refills: 0 | OUTPATIENT
Start: 2017-12-03 | End: 2017-12-17

## 2017-12-03 RX ORDER — METOPROLOL TARTRATE 50 MG
0.5 TABLET ORAL
Qty: 14 | Refills: 0 | OUTPATIENT
Start: 2017-12-03 | End: 2017-12-17

## 2017-12-03 RX ORDER — DIVALPROEX SODIUM 500 MG/1
3 TABLET, DELAYED RELEASE ORAL
Qty: 42 | Refills: 0 | OUTPATIENT
Start: 2017-12-03 | End: 2017-12-17

## 2017-12-03 RX ADMIN — DIVALPROEX SODIUM 1500 MILLIGRAM(S): 500 TABLET, DELAYED RELEASE ORAL at 21:43

## 2017-12-03 RX ADMIN — Medication 81 MILLIGRAM(S): at 09:54

## 2017-12-03 RX ADMIN — Medication 12.5 MILLIGRAM(S): at 21:43

## 2017-12-03 RX ADMIN — OLANZAPINE 10 MILLIGRAM(S): 15 TABLET, FILM COATED ORAL at 21:43

## 2017-12-03 RX ADMIN — Medication 100 MILLIGRAM(S): at 09:54

## 2017-12-03 RX ADMIN — Medication 1 MILLIGRAM(S): at 09:54

## 2017-12-03 RX ADMIN — Medication 12.5 MILLIGRAM(S): at 09:54

## 2017-12-04 VITALS — TEMPERATURE: 98 F

## 2017-12-04 PROCEDURE — 99238 HOSP IP/OBS DSCHRG MGMT 30/<: CPT

## 2017-12-04 RX ADMIN — Medication 100 MILLIGRAM(S): at 09:20

## 2017-12-04 RX ADMIN — Medication 12.5 MILLIGRAM(S): at 09:19

## 2017-12-04 RX ADMIN — Medication 81 MILLIGRAM(S): at 09:19

## 2017-12-04 RX ADMIN — Medication 1 MILLIGRAM(S): at 09:19

## 2018-05-23 ENCOUNTER — HOSPITAL ENCOUNTER (EMERGENCY)
Dept: HOSPITAL 14 - H.ER | Age: 58
LOS: 1 days | Discharge: ANOTHER HOSPITAL | End: 2018-05-24
Payer: MEDICAID

## 2018-05-23 VITALS — TEMPERATURE: 98.2 F

## 2018-05-23 VITALS — BODY MASS INDEX: 23.8 KG/M2

## 2018-05-23 DIAGNOSIS — F20.9: Primary | ICD-10-CM

## 2018-05-23 DIAGNOSIS — E78.00: ICD-10-CM

## 2018-05-23 DIAGNOSIS — Z95.1: ICD-10-CM

## 2018-05-23 LAB
BASOPHILS # BLD AUTO: 0 K/UL (ref 0–0.2)
BASOPHILS NFR BLD: 1.1 % (ref 0–2)
BUN SERPL-MCNC: 8 MG/DL (ref 9–20)
CALCIUM SERPL-MCNC: 9.2 MG/DL (ref 8.4–10.2)
EOSINOPHIL # BLD AUTO: 0.4 K/UL (ref 0–0.7)
EOSINOPHIL NFR BLD: 9.3 % (ref 0–4)
ERYTHROCYTE [DISTWIDTH] IN BLOOD BY AUTOMATED COUNT: 13.6 % (ref 11.5–14.5)
GFR NON-AFRICAN AMERICAN: > 60
HGB BLD-MCNC: 14.2 G/DL (ref 12–18)
LYMPHOCYTES # BLD AUTO: 1.7 K/UL (ref 1–4.3)
LYMPHOCYTES NFR BLD AUTO: 39.3 % (ref 20–40)
MCH RBC QN AUTO: 30.8 PG (ref 27–31)
MCHC RBC AUTO-ENTMCNC: 34 G/DL (ref 33–37)
MCV RBC AUTO: 90.7 FL (ref 80–94)
MONOCYTES # BLD: 0.4 K/UL (ref 0–0.8)
MONOCYTES NFR BLD: 8.5 % (ref 0–10)
NEUTROPHILS # BLD: 1.8 K/UL (ref 1.8–7)
NEUTROPHILS NFR BLD AUTO: 41.8 % (ref 50–75)
NRBC BLD AUTO-RTO: 0.1 % (ref 0–0)
PLATELET # BLD: 255 K/UL (ref 130–400)
PMV BLD AUTO: 7.9 FL (ref 7.2–11.7)
RBC # BLD AUTO: 4.6 MIL/UL (ref 4.4–5.9)
WBC # BLD AUTO: 4.3 K/UL (ref 4.8–10.8)

## 2018-05-23 NOTE — ED PDOC
HPI: Psych/Substance Abuse


Time Seen by Provider: 05/23/18 10:04


Chief Complaint (Provider): Aggressive behavior


History Per: Patient


History/Exam Limitations: no limitations


Onset/Duration Of Symptoms: Days (today)


Additional Complaint(s): 





Pt. called police about a bomb he had.  He was talking in tangents so sent to 

the ER.  Pt. states he called in a bomb threat.  Then he goes to talk about 

texting about a threat in [place] at a restaurant.  Pt. is talking in multiple 

tangents.  Denies suicidal or homicidal ideation.  Is pain free.  No dyspnea.  

Takes niacin but no psychiatric medicine.  No chest pain, dyspnea, drugs, etoh.

   States he has all 10 psychiatric issues.  





Past Medical History


Reviewed: Nursing Documentation, Vital Signs





- Medical History


PMH: Hypercholesterolemia


   Denies: Chronic Kidney Disease


Other PMH: multiple psychiatric issues (per pt.)





- Surgical History


Surgical History: CABG (may 14)





- Family History


Family History: States: Unknown Family Hx





- Immunization History


Hx Tetanus Toxoid Vaccination: No


Hx Influenza Vaccination: Yes


Hx Pneumococcal Vaccination: No





- Home Medications


Home Medications: 


 Ambulatory Orders











 Medication  Instructions  Recorded


 


Pseudoephedrine Hydrochlorid 120 mg PO Q12 PRN 03/27/14





[Sudafed 12 Hour]  


 


Albuterol HFA [Ventolin HFA 90 2 puff IH [id number] PRN #1 puff 10/18/14





mcg/actuation (8 g)]  














- Allergies


Allergies/Adverse Reactions: 


 Allergies











Allergy/AdvReac Type Severity Reaction Status Date / Time


 


No Known Allergies Allergy   Verified 10/18/14 20:06














Review of Systems


ROS Statement: Except As Marked, All Systems Reviewed And Found Negative


Psych: Positive for: Psychosis





Physical Exam





- Reviewed


Nursing Documentation Reviewed: Yes


Vital Signs Reviewed: Yes





- Physical Exam


Appears: Positive for: Non-toxic, No Acute Distress


Head Exam: Positive for: ATRAUMATIC, NORMAL INSPECTION, NORMOCEPHALIC


Skin: Positive for: Normal Color, Warm, DRY


Eye Exam: Positive for: EOMI, Normal appearance, PERRL


ENT: Positive for: Normal ENT Inspection


Neck: Positive for: Normal, Painless ROM


Cardiovascular/Chest: Positive for: Regular Rate, Rhythm


Respiratory: Positive for: CNT, Normal Breath Sounds


Gastrointestinal/Abdominal: Positive for: Normal Exam, Soft.  Negative for: 

Tenderness


Back: Positive for: Normal Inspection.  Negative for: L CVA Tenderness, R CVA 

Tenderness


Extremity: Positive for: Normal ROM.  Negative for: Tenderness, Pedal Edema


Neurologic/Psych: Positive for: Alert, Oriented.  Negative for: Motor/Sensory 

Deficits, Aphasia, Facial Droop





- Laboratory Results


Result Diagrams: 


 05/23/18 10:30





 05/23/18 10:30


Interpretation Of Abn Labs: no acute





- ECG


ECG: Positive for: Interpreted By Me, Viewed By Me


ECG Rhythm: Positive for: Normal QRS, Normal ST Segment, Sinus Rhythm





- Progress


ED Course And Treament: 





1500:  Stable.  Medically cleared.  [place] [facility]  





1850:  Dr. Johnson to fu [place] [facility]  





Disposition





- Clinical Impression


Clinical Impression: 


 Acute psychosis








- Patient ED Disposition


Is Patient to be Admitted: Transfer of Care





- Disposition


Disposition: Transfer of Care


Disposition Time: 18:50


Condition: STABLE


Patient Signed Over To: Stanislaw Johnson

## 2018-05-23 NOTE — ED PDOC
- Laboratory Results


Result Diagrams: 


 05/23/18 10:30





 05/23/18 10:30





- ECG


O2 Sat by Pulse Oximetry: 99





Medical Decision Making


Medical Decision Making: 


-- Patient signed to me by Dr. Smith @1900, pending follow up on [facility]





Patient accepted for involuntary admission by Dr Schuler and jose c Almonte Schizophrenia


Fair


_____________________________________________________


Scribe Attestation:


Documented by Sera Amos, acting as a scribe for Dr. Stanislaw Johnson MD.





Provider Scribe Attestation:


All medical record entries made by the Scribe were at my direction and 

personally dictated by me. I have reviewed the chart and agree that the record 

accurately reflects my personal performance of the history, physical exam, 

medical decision making, and the department course for this patient. I have 

also personally directed, reviewed, and agree with the discharge instructions 

and disposition.





Disposition





- Clinical Impression


Clinical Impression: 


 Schizophrenia








- POA


Present On Arrival: None





- Disposition


Disposition: Routine/Home


Disposition Time: 22:00


Condition: FAIR


Forms:  [employer] (English)

## 2018-05-23 NOTE — RAD
HISTORY:

psych eval  



COMPARISON:

3/6/2017 



FINDINGS:



LUNGS:

No active pulmonary disease.



PLEURA:

No significant pleural effusion identified, no pneumothorax apparent.



CARDIOVASCULAR:

Normal heart size.  Midline sternotomy.  Discontinuous superior 

sternal wires unchanged. Coronary artery bypass clips.



OSSEOUS STRUCTURES:

No significant abnormalities.



VISUALIZED UPPER ABDOMEN:

Normal.



OTHER FINDINGS:

None.



IMPRESSION:

No active disease.

## 2018-05-24 VITALS — SYSTOLIC BLOOD PRESSURE: 127 MMHG | DIASTOLIC BLOOD PRESSURE: 77 MMHG | HEART RATE: 68 BPM | RESPIRATION RATE: 14 BRPM

## 2018-05-24 VITALS — OXYGEN SATURATION: 99 %

## 2018-05-24 NOTE — CARD
--------------- APPROVED REPORT --------------





EKG Measurement

Heart [id number]

MA [id number]

[id number]

[id number]

[id number]



<Conclusion>

Normal sinus rhythm

Normal ECG

## 2024-02-27 NOTE — ED ADULT NURSE NOTE - FALL HARM RISK TYPE OF ASSESSMENT
From: Dante Colindres  To: Madeline Salvador  Sent: 2/27/2024 4:57 AM CST  Subject: Appt     Also I saw 730 appt on the 7th album it wouldn’t let me book that is that something you can help me with   
Admission